# Patient Record
Sex: FEMALE | Race: BLACK OR AFRICAN AMERICAN | NOT HISPANIC OR LATINO | Employment: OTHER | URBAN - METROPOLITAN AREA
[De-identification: names, ages, dates, MRNs, and addresses within clinical notes are randomized per-mention and may not be internally consistent; named-entity substitution may affect disease eponyms.]

---

## 2022-01-18 DIAGNOSIS — U07.1 COVID-19 VIRUS DETECTED: ICD-10-CM

## 2022-04-21 ENCOUNTER — TELEPHONE (OUTPATIENT)
Dept: RHEUMATOLOGY | Facility: CLINIC | Age: 67
End: 2022-04-21

## 2022-04-21 NOTE — TELEPHONE ENCOUNTER
Spoke to Ms Al and told her next available new patient appointment is in Nov 2022. She doesn't want to schedule an appt today but wants to call back when she has her appt book. Gave her [phone] to call.

## 2022-04-26 ENCOUNTER — IMMUNIZATION (OUTPATIENT)
Dept: FAMILY MEDICINE | Facility: CLINIC | Age: 67
End: 2022-04-26

## 2022-04-26 DIAGNOSIS — Z23 NEED FOR VACCINATION: Primary | ICD-10-CM

## 2022-04-26 PROCEDURE — 91305 COVID-19, MRNA, LNP-S, PF, 30 MCG/0.3 ML DOSE VACCINE (PFIZER): CPT | Mod: PBBFAC,PO

## 2022-12-06 ENCOUNTER — OFFICE VISIT (OUTPATIENT)
Dept: RHEUMATOLOGY | Facility: CLINIC | Age: 67
End: 2022-12-06
Payer: MEDICARE

## 2022-12-06 VITALS
WEIGHT: 273.06 LBS | HEART RATE: 86 BPM | HEIGHT: 61 IN | BODY MASS INDEX: 51.55 KG/M2 | DIASTOLIC BLOOD PRESSURE: 72 MMHG | SYSTOLIC BLOOD PRESSURE: 172 MMHG

## 2022-12-06 DIAGNOSIS — M25.50 POLYARTHRALGIA: Primary | ICD-10-CM

## 2022-12-06 DIAGNOSIS — R76.8 AUTOANTIBODY TITER POSITIVE: ICD-10-CM

## 2022-12-06 DIAGNOSIS — E79.0 HYPERURICEMIA: ICD-10-CM

## 2022-12-06 PROCEDURE — 99205 OFFICE O/P NEW HI 60 MIN: CPT | Mod: S$GLB,,, | Performed by: INTERNAL MEDICINE

## 2022-12-06 PROCEDURE — 3077F SYST BP >= 140 MM HG: CPT | Mod: CPTII,S$GLB,, | Performed by: INTERNAL MEDICINE

## 2022-12-06 PROCEDURE — 3078F DIAST BP <80 MM HG: CPT | Mod: CPTII,S$GLB,, | Performed by: INTERNAL MEDICINE

## 2022-12-06 PROCEDURE — 3077F PR MOST RECENT SYSTOLIC BLOOD PRESSURE >= 140 MM HG: ICD-10-PCS | Mod: CPTII,S$GLB,, | Performed by: INTERNAL MEDICINE

## 2022-12-06 PROCEDURE — 99999 PR PBB SHADOW E&M-EST. PATIENT-LVL IV: CPT | Mod: PBBFAC,,, | Performed by: INTERNAL MEDICINE

## 2022-12-06 PROCEDURE — 3008F PR BODY MASS INDEX (BMI) DOCUMENTED: ICD-10-PCS | Mod: CPTII,S$GLB,, | Performed by: INTERNAL MEDICINE

## 2022-12-06 PROCEDURE — 3288F PR FALLS RISK ASSESSMENT DOCUMENTED: ICD-10-PCS | Mod: CPTII,S$GLB,, | Performed by: INTERNAL MEDICINE

## 2022-12-06 PROCEDURE — 1101F PR PT FALLS ASSESS DOC 0-1 FALLS W/OUT INJ PAST YR: ICD-10-PCS | Mod: CPTII,S$GLB,, | Performed by: INTERNAL MEDICINE

## 2022-12-06 PROCEDURE — 99205 PR OFFICE/OUTPT VISIT, NEW, LEVL V, 60-74 MIN: ICD-10-PCS | Mod: S$GLB,,, | Performed by: INTERNAL MEDICINE

## 2022-12-06 PROCEDURE — 3078F PR MOST RECENT DIASTOLIC BLOOD PRESSURE < 80 MM HG: ICD-10-PCS | Mod: CPTII,S$GLB,, | Performed by: INTERNAL MEDICINE

## 2022-12-06 PROCEDURE — 1159F MED LIST DOCD IN RCRD: CPT | Mod: CPTII,S$GLB,, | Performed by: INTERNAL MEDICINE

## 2022-12-06 PROCEDURE — 1101F PT FALLS ASSESS-DOCD LE1/YR: CPT | Mod: CPTII,S$GLB,, | Performed by: INTERNAL MEDICINE

## 2022-12-06 PROCEDURE — 3008F BODY MASS INDEX DOCD: CPT | Mod: CPTII,S$GLB,, | Performed by: INTERNAL MEDICINE

## 2022-12-06 PROCEDURE — 3288F FALL RISK ASSESSMENT DOCD: CPT | Mod: CPTII,S$GLB,, | Performed by: INTERNAL MEDICINE

## 2022-12-06 PROCEDURE — 1159F PR MEDICATION LIST DOCUMENTED IN MEDICAL RECORD: ICD-10-PCS | Mod: CPTII,S$GLB,, | Performed by: INTERNAL MEDICINE

## 2022-12-06 PROCEDURE — 99999 PR PBB SHADOW E&M-EST. PATIENT-LVL IV: ICD-10-PCS | Mod: PBBFAC,,, | Performed by: INTERNAL MEDICINE

## 2022-12-06 PROCEDURE — 1125F PR PAIN SEVERITY QUANTIFIED, PAIN PRESENT: ICD-10-PCS | Mod: CPTII,S$GLB,, | Performed by: INTERNAL MEDICINE

## 2022-12-06 PROCEDURE — 1125F AMNT PAIN NOTED PAIN PRSNT: CPT | Mod: CPTII,S$GLB,, | Performed by: INTERNAL MEDICINE

## 2022-12-06 RX ORDER — ALLOPURINOL 300 MG/1
300 TABLET ORAL DAILY
Status: ON HOLD | COMMUNITY
End: 2023-04-19 | Stop reason: HOSPADM

## 2022-12-06 RX ORDER — ROSUVASTATIN CALCIUM 40 MG/1
40 TABLET, COATED ORAL DAILY
COMMUNITY

## 2022-12-06 RX ORDER — LINAGLIPTIN 5 MG/1
5 TABLET, FILM COATED ORAL DAILY
COMMUNITY

## 2022-12-06 RX ORDER — SEMAGLUTIDE 1.34 MG/ML
1 INJECTION, SOLUTION SUBCUTANEOUS
COMMUNITY

## 2022-12-06 RX ORDER — ZINC GLUCONATE 50 MG
50 TABLET ORAL DAILY
COMMUNITY

## 2022-12-06 RX ORDER — HYDRALAZINE HYDROCHLORIDE 50 MG/1
50 TABLET, FILM COATED ORAL 2 TIMES DAILY
Status: ON HOLD | COMMUNITY
End: 2023-04-19 | Stop reason: HOSPADM

## 2022-12-06 RX ORDER — LEVOCETIRIZINE DIHYDROCHLORIDE 5 MG/1
5 TABLET, FILM COATED ORAL NIGHTLY
COMMUNITY

## 2022-12-06 ASSESSMENT — ROUTINE ASSESSMENT OF PATIENT INDEX DATA (RAPID3)
PAIN SCORE: 7
TOTAL RAPID3 SCORE: 4.67
FATIGUE SCORE: 1.1
PSYCHOLOGICAL DISTRESS SCORE: 1.1
MDHAQ FUNCTION SCORE: 0.6
PATIENT GLOBAL ASSESSMENT SCORE: 5

## 2022-12-06 NOTE — PROGRESS NOTES
Subjective:          Chief Complaint: Otto Al is a 67 y.o. female who had concerns including Rheumatoid Arthritis.    HPI:    By her medical history she has congestive heart failure diabetes mellitus obesity hypoventilation syndrome and I do see that she is on allopurinol.   Patient was seen with Ms. Salazar (previously with Dr. Luke office) did have some labs and was referred to Rheumatology. To patients knowledge she was told she had:  Rheumatoid arthritis.   Patient has no prior diagnosis of SLE or RA .   No seizures, strokes, DVT, nephritis, renal stones, photosensitivity, rashes (malar, psoriasis, sclerodactyly), autoimmune mediated anemia. No alopecia. No dry eyes or dry mouth that has been an issue. Hx of Lumbar arthritis treated once with intervention.   Manages her joints mostly with Tylenol or juaquin ASA (tries only few times weekly as to not harm her kidneys).   No fmHx of SLE or RA.     REVIEW OF SYSTEMS:    Review of Systems   Constitutional:  Negative for fever, malaise/fatigue and weight loss.   HENT:  Negative for sore throat.    Eyes:  Negative for double vision, photophobia and redness.   Respiratory:  Negative for cough, shortness of breath and wheezing.    Cardiovascular:  Negative for chest pain, palpitations and orthopnea.   Gastrointestinal:  Negative for abdominal pain, constipation and diarrhea.   Genitourinary:  Negative for dysuria, hematuria and urgency.   Musculoskeletal:  Positive for joint pain (knees) and myalgias. Negative for back pain.   Skin:  Negative for rash.   Neurological:  Negative for dizziness, tingling, focal weakness and headaches.   Endo/Heme/Allergies:  Does not bruise/bleed easily.   Psychiatric/Behavioral:  Negative for depression, hallucinations and suicidal ideas.              Objective:            Past Medical History:   Diagnosis Date    Biot's respiration     CHF (congestive heart failure)     Diabetes mellitus     Diastolic heart failure     Obesity  hypoventilation syndrome     on home O2 at 2L    Obstructive sleep apnea     Thyroid disease      Family History   Problem Relation Age of Onset    Breast cancer Sister     Breast cancer Maternal Aunt      Social History     Tobacco Use    Smoking status: Never    Smokeless tobacco: Never   Substance Use Topics    Alcohol use: No    Drug use: No         Current Outpatient Medications on File Prior to Visit   Medication Sig Dispense Refill    allopurinoL (ZYLOPRIM) 300 MG tablet allopurinol 300 mg tablet      aspirin (ECOTRIN) 81 MG EC tablet Take 81 mg by mouth once daily.      cholecalciferol, vitamin D3, (VITAMIN D3) 5,000 unit Tab Take 10,000 Units by mouth once daily.      cyanocobalamin 2000 MCG tablet Take 2,000 mcg by mouth once daily.      hydrALAZINE (APRESOLINE) 50 MG tablet Take 50 mg by mouth 2 (two) times a day.      levocetirizine (XYZAL) 5 MG tablet levocetirizine 5 mg tablet      levothyroxine (SYNTHROID) 150 MCG tablet Take 150 mcg by mouth Daily. (Patient taking differently: Take 150 mcg by mouth before breakfast.)      linaGLIPtin (TRADJENTA) 5 mg Tab tablet Tradjenta 5 mg tablet      liothyronine (CYTOMEL) 25 MCG Tab Take 25 mcg by mouth Daily.      losartan (COZAAR) 25 MG tablet Take 25 mg by mouth once daily.      metformin (GLUCOPHAGE) 500 MG tablet Take 500 mg by mouth 2 (two) times daily with meals.      multivitamin capsule Take 1 capsule by mouth once daily.      nebivolol (BYSTOLIC) 2.5 MG Tab Take 5 mg by mouth once daily.       OXYGEN-AIR DELIVERY SYSTEMS MISC 3 L/min by Nasal route continuous. 2-4 lpm      rosuvastatin (CRESTOR) 40 MG Tab rosuvastatin 40 mg tablet      semaglutide (OZEMPIC) 1 mg/dose (4 mg/3 mL) Ozempic 1 mg/dose (4 mg/3 mL) subcutaneous pen injector      zinc gluconate 50 mg tablet Take 50 mg by mouth once daily.      albuterol sulfate 2.5 mg/0.5 mL Nebu Take 2.5 mg by nebulization 2 (two) times daily. 150 mg 1    atorvastatin (LIPITOR) 40 MG tablet Take 40 mg by  mouth once daily.      furosemide (LASIX) 40 MG tablet Take 1 tablet (40 mg total) by mouth 2 (two) times daily. 60 tablet 11     No current facility-administered medications on file prior to visit.       Vitals:    12/06/22 0915   BP: (!) 172/72   Pulse: 86       Physical Exam:    Physical Exam  Constitutional:       Appearance: She is well-developed.   HENT:      Head: Normocephalic and atraumatic.   Eyes:      Pupils: Pupils are equal, round, and reactive to light.   Cardiovascular:      Rate and Rhythm: Normal rate and regular rhythm.      Heart sounds: Normal heart sounds.   Pulmonary:      Effort: Pulmonary effort is normal.      Breath sounds: Normal breath sounds.   Musculoskeletal:      Right shoulder: No swelling or tenderness. Normal range of motion.      Left shoulder: No swelling or tenderness. Normal range of motion.      Right elbow: No swelling. Normal range of motion. No tenderness.      Left elbow: No swelling. Normal range of motion. No tenderness.      Right wrist: No swelling or tenderness. Normal range of motion.      Left wrist: No swelling or tenderness. Normal range of motion.      Right hand: No swelling or tenderness. Normal range of motion.      Left hand: No swelling or tenderness. Normal range of motion.      Cervical back: Normal range of motion.      Right knee: No swelling. Normal range of motion. No tenderness.      Left knee: No swelling. Normal range of motion. No tenderness.      Right foot: Normal range of motion. No swelling or tenderness.      Left foot: Normal range of motion. No swelling or tenderness.      Comments: +crepitus bilateral knees    Skin:     General: Skin is warm and dry.   Neurological:      Mental Status: She is alert and oriented to person, place, and time.   Psychiatric:         Behavior: Behavior normal.             Assessment:       Encounter Diagnoses   Name Primary?    Polyarthralgia Yes    Autoantibody titer positive     Hyperuricemia           Plan:         Polyarthralgia  -     RHEUMATOID FACTOR; Future; Expected date: 12/06/2022  -     Cyclic Citrullinated Peptide Antibody, IgG; Future; Expected date: 12/06/2022  -     Sedimentation rate; Future; Expected date: 12/06/2022  -     C-Reactive Protein; Standing; Expected date: 12/06/2022    Autoantibody titer positive    Hyperuricemia  -     Uric Acid; Future; Expected date: 12/06/2022    Absolutely delightful 67 year old female with ? Hx of RA but we did not have the labs from her PCP.   We will check her serologies.   Certainly she has crepitus knees with valgus changes. Older imaging from [facility] reviewed with tricompartmental narrowing.  Discussed options with her about imaging and injections (prefers to avoid injections)   For now continue with ASA and Tylenol PRN- she is cautious about harming her kidney. Discussed Tylenol would not adversely affect her kidney and she can safely use 2,000mg daily     No follow-ups on file.      F/u 6-8 weeks to discuss labs and options.   60min consultation with greater than 50% of that time included Preparing to see the patient (review records, tests), Obtaining and/or reviewing separately obtained historical data, Performing a medically appropriate examination and/or evaluation , Ordering medications, tests, and/or procedures, Referring and communicating with other healthcare professionals , Documenting clinical information in the electronic or other health record and Independently interpreting results  (as warranted) & communicating results to the patient/family/caregiver. All questions answered.

## 2023-02-07 ENCOUNTER — OFFICE VISIT (OUTPATIENT)
Dept: RHEUMATOLOGY | Facility: CLINIC | Age: 68
End: 2023-02-07
Payer: MEDICARE

## 2023-02-07 VITALS
SYSTOLIC BLOOD PRESSURE: 207 MMHG | HEART RATE: 71 BPM | WEIGHT: 278.75 LBS | HEIGHT: 61 IN | BODY MASS INDEX: 52.63 KG/M2 | DIASTOLIC BLOOD PRESSURE: 96 MMHG

## 2023-02-07 DIAGNOSIS — M06.09 RHEUMATOID ARTHRITIS OF MULTIPLE SITES WITH NEGATIVE RHEUMATOID FACTOR: ICD-10-CM

## 2023-02-07 DIAGNOSIS — M25.50 POLYARTHRALGIA: Primary | ICD-10-CM

## 2023-02-07 PROCEDURE — 1159F PR MEDICATION LIST DOCUMENTED IN MEDICAL RECORD: ICD-10-PCS | Mod: CPTII,S$GLB,, | Performed by: INTERNAL MEDICINE

## 2023-02-07 PROCEDURE — 3288F FALL RISK ASSESSMENT DOCD: CPT | Mod: CPTII,S$GLB,, | Performed by: INTERNAL MEDICINE

## 2023-02-07 PROCEDURE — 4010F ACE/ARB THERAPY RXD/TAKEN: CPT | Mod: CPTII,S$GLB,, | Performed by: INTERNAL MEDICINE

## 2023-02-07 PROCEDURE — 3008F PR BODY MASS INDEX (BMI) DOCUMENTED: ICD-10-PCS | Mod: CPTII,S$GLB,, | Performed by: INTERNAL MEDICINE

## 2023-02-07 PROCEDURE — 3080F PR MOST RECENT DIASTOLIC BLOOD PRESSURE >= 90 MM HG: ICD-10-PCS | Mod: CPTII,S$GLB,, | Performed by: INTERNAL MEDICINE

## 2023-02-07 PROCEDURE — 1125F PR PAIN SEVERITY QUANTIFIED, PAIN PRESENT: ICD-10-PCS | Mod: CPTII,S$GLB,, | Performed by: INTERNAL MEDICINE

## 2023-02-07 PROCEDURE — 1159F MED LIST DOCD IN RCRD: CPT | Mod: CPTII,S$GLB,, | Performed by: INTERNAL MEDICINE

## 2023-02-07 PROCEDURE — 99214 OFFICE O/P EST MOD 30 MIN: CPT | Mod: S$GLB,,, | Performed by: INTERNAL MEDICINE

## 2023-02-07 PROCEDURE — 4010F PR ACE/ARB THEARPY RXD/TAKEN: ICD-10-PCS | Mod: CPTII,S$GLB,, | Performed by: INTERNAL MEDICINE

## 2023-02-07 PROCEDURE — 3077F SYST BP >= 140 MM HG: CPT | Mod: CPTII,S$GLB,, | Performed by: INTERNAL MEDICINE

## 2023-02-07 PROCEDURE — 3080F DIAST BP >= 90 MM HG: CPT | Mod: CPTII,S$GLB,, | Performed by: INTERNAL MEDICINE

## 2023-02-07 PROCEDURE — 1160F RVW MEDS BY RX/DR IN RCRD: CPT | Mod: CPTII,S$GLB,, | Performed by: INTERNAL MEDICINE

## 2023-02-07 PROCEDURE — 99999 PR PBB SHADOW E&M-EST. PATIENT-LVL IV: ICD-10-PCS | Mod: PBBFAC,,, | Performed by: INTERNAL MEDICINE

## 2023-02-07 PROCEDURE — 1160F PR REVIEW ALL MEDS BY PRESCRIBER/CLIN PHARMACIST DOCUMENTED: ICD-10-PCS | Mod: CPTII,S$GLB,, | Performed by: INTERNAL MEDICINE

## 2023-02-07 PROCEDURE — 3008F BODY MASS INDEX DOCD: CPT | Mod: CPTII,S$GLB,, | Performed by: INTERNAL MEDICINE

## 2023-02-07 PROCEDURE — 3077F PR MOST RECENT SYSTOLIC BLOOD PRESSURE >= 140 MM HG: ICD-10-PCS | Mod: CPTII,S$GLB,, | Performed by: INTERNAL MEDICINE

## 2023-02-07 PROCEDURE — 1125F AMNT PAIN NOTED PAIN PRSNT: CPT | Mod: CPTII,S$GLB,, | Performed by: INTERNAL MEDICINE

## 2023-02-07 PROCEDURE — 1101F PR PT FALLS ASSESS DOC 0-1 FALLS W/OUT INJ PAST YR: ICD-10-PCS | Mod: CPTII,S$GLB,, | Performed by: INTERNAL MEDICINE

## 2023-02-07 PROCEDURE — 1101F PT FALLS ASSESS-DOCD LE1/YR: CPT | Mod: CPTII,S$GLB,, | Performed by: INTERNAL MEDICINE

## 2023-02-07 PROCEDURE — 99999 PR PBB SHADOW E&M-EST. PATIENT-LVL IV: CPT | Mod: PBBFAC,,, | Performed by: INTERNAL MEDICINE

## 2023-02-07 PROCEDURE — 99214 PR OFFICE/OUTPT VISIT, EST, LEVL IV, 30-39 MIN: ICD-10-PCS | Mod: S$GLB,,, | Performed by: INTERNAL MEDICINE

## 2023-02-07 PROCEDURE — 3288F PR FALLS RISK ASSESSMENT DOCUMENTED: ICD-10-PCS | Mod: CPTII,S$GLB,, | Performed by: INTERNAL MEDICINE

## 2023-02-07 RX ORDER — HYDROXYCHLOROQUINE SULFATE 200 MG/1
200 TABLET, FILM COATED ORAL 2 TIMES DAILY
Qty: 60 TABLET | Refills: 6 | Status: SHIPPED | OUTPATIENT
Start: 2023-02-07 | End: 2024-02-07

## 2023-02-07 ASSESSMENT — ROUTINE ASSESSMENT OF PATIENT INDEX DATA (RAPID3)
FATIGUE SCORE: 1.1
MDHAQ FUNCTION SCORE: 0.9
PATIENT GLOBAL ASSESSMENT SCORE: 4.5
TOTAL RAPID3 SCORE: 5
PAIN SCORE: 7.5
PSYCHOLOGICAL DISTRESS SCORE: 0

## 2023-02-07 NOTE — PATIENT INSTRUCTIONS
Dr. Slade--Labs can be done when your PCP orders labs anytime, or have them done right before our next visit.

## 2023-02-07 NOTE — PROGRESS NOTES
Subjective:          Chief Complaint: Otto Al is a 67 y.o. female who had concerns including Disease Management.    HPI:  Interval   2/2023: patient with high titer +RF no real sicca complaints. Most consistent with RA   Overall legs (knees, ankles, feet) are the worst. Knees with advanced OA.     Rheum Hx:   By her medical history she has congestive heart failure diabetes mellitus, obesity hypoventilation syndrome, and I do see that she is on allopurinol.   Patient was seen with Ms. Salazar (previously with Dr. Luke office) did have some labs and was referred to Rheumatology. To patients knowledge she was told she had:  Rheumatoid arthritis.   Patient has no prior diagnosis of SLE or RA .   No seizures, strokes, DVT, nephritis, renal stones, photosensitivity, rashes (malar, psoriasis, sclerodactyly), autoimmune mediated anemia. No alopecia. No dry eyes or dry mouth that has been an issue. Hx of Lumbar arthritis treated once with intervention.   Manages her joints mostly with Tylenol or juaquin ASA (tries only few times weekly as to not harm her kidneys).   No fmHx of SLE or RA.     Patient returns to review labs.:   Denies burning itching eyes.   Component      Latest Ref Rng & Units 1/9/2023   Rheumatoid Factor      0.0 - 15.0 IU/mL 164.6 (H)   CCP Antibodies      <5.0 U/mL <0.5   Sed Rate      0 - 29 mm/Hr 67 (H)   CRP      0.00 - 0.90 mg/dL 0.70   Uric Acid      2.4 - 5.7 mg/dL 9.0 (H)         REVIEW OF SYSTEMS:    Review of Systems   Constitutional:  Negative for fever, malaise/fatigue and weight loss.   HENT:  Negative for sore throat.    Eyes:  Negative for double vision, photophobia and redness.   Respiratory:  Negative for cough, shortness of breath and wheezing.    Cardiovascular:  Negative for chest pain, palpitations and orthopnea.   Gastrointestinal:  Negative for abdominal pain, constipation and diarrhea.   Genitourinary:  Negative for dysuria, hematuria and urgency.   Musculoskeletal:  Positive  for joint pain (knees) and myalgias. Negative for back pain.   Skin:  Negative for rash.   Neurological:  Negative for dizziness, tingling, focal weakness and headaches.   Endo/Heme/Allergies:  Does not bruise/bleed easily.   Psychiatric/Behavioral:  Negative for depression, hallucinations and suicidal ideas.              Objective:            Past Medical History:   Diagnosis Date    Biot's respiration     CHF (congestive heart failure)     Diabetes mellitus     Diastolic heart failure     Obesity hypoventilation syndrome     on home O2 at 2L    Obstructive sleep apnea     Thyroid disease      Family History   Problem Relation Age of Onset    Breast cancer Sister     Breast cancer Maternal Aunt      Social History     Tobacco Use    Smoking status: Never    Smokeless tobacco: Never   Substance Use Topics    Alcohol use: No    Drug use: No         Current Outpatient Medications on File Prior to Visit   Medication Sig Dispense Refill    allopurinoL (ZYLOPRIM) 300 MG tablet allopurinol 300 mg tablet      aspirin (ECOTRIN) 81 MG EC tablet Take 81 mg by mouth once daily.      atorvastatin (LIPITOR) 40 MG tablet Take 40 mg by mouth once daily.      cholecalciferol, vitamin D3, (VITAMIN D3) 5,000 unit Tab Take 10,000 Units by mouth once daily.      cyanocobalamin 2000 MCG tablet Take 2,000 mcg by mouth once daily.      hydrALAZINE (APRESOLINE) 50 MG tablet Take 50 mg by mouth 2 (two) times a day.      levocetirizine (XYZAL) 5 MG tablet levocetirizine 5 mg tablet      levothyroxine (SYNTHROID) 150 MCG tablet Take 150 mcg by mouth Daily. (Patient taking differently: Take 150 mcg by mouth before breakfast.)      linaGLIPtin (TRADJENTA) 5 mg Tab tablet Tradjenta 5 mg tablet      liothyronine (CYTOMEL) 25 MCG Tab Take 25 mcg by mouth Daily.      losartan (COZAAR) 25 MG tablet Take 25 mg by mouth once daily.      metformin (GLUCOPHAGE) 500 MG tablet Take 500 mg by mouth 2 (two) times daily with meals.      multivitamin capsule  Take 1 capsule by mouth once daily.      nebivolol (BYSTOLIC) 2.5 MG Tab Take 5 mg by mouth once daily.       OXYGEN-AIR DELIVERY SYSTEMS MISC 3 L/min by Nasal route continuous. 2-4 lpm      rosuvastatin (CRESTOR) 40 MG Tab rosuvastatin 40 mg tablet      semaglutide (OZEMPIC) 1 mg/dose (4 mg/3 mL) Ozempic 1 mg/dose (4 mg/3 mL) subcutaneous pen injector      zinc gluconate 50 mg tablet Take 50 mg by mouth once daily.      albuterol sulfate 2.5 mg/0.5 mL Nebu Take 2.5 mg by nebulization 2 (two) times daily. 150 mg 1    furosemide (LASIX) 40 MG tablet Take 1 tablet (40 mg total) by mouth 2 (two) times daily. 60 tablet 11     No current facility-administered medications on file prior to visit.       Vitals:    02/07/23 1324   BP: (!) 207/96   Pulse: 71       Physical Exam:    Physical Exam  Constitutional:       Appearance: She is well-developed.   HENT:      Head: Normocephalic and atraumatic.   Eyes:      Pupils: Pupils are equal, round, and reactive to light.   Cardiovascular:      Rate and Rhythm: Normal rate and regular rhythm.      Heart sounds: Normal heart sounds.   Pulmonary:      Effort: Pulmonary effort is normal.      Breath sounds: Normal breath sounds.   Musculoskeletal:      Right shoulder: No swelling or tenderness. Normal range of motion.      Left shoulder: No swelling or tenderness. Normal range of motion.      Right elbow: No swelling. Normal range of motion. No tenderness.      Left elbow: No swelling. Normal range of motion. No tenderness.      Right wrist: No swelling or tenderness. Normal range of motion.      Left wrist: No swelling or tenderness. Normal range of motion.      Right hand: No swelling or tenderness. Normal range of motion.      Left hand: No swelling or tenderness. Normal range of motion.      Cervical back: Normal range of motion.      Right knee: No swelling. Normal range of motion. No tenderness.      Left knee: No swelling. Normal range of motion. No tenderness.      Right  foot: Normal range of motion. No swelling or tenderness.      Left foot: Normal range of motion. No swelling or tenderness.      Comments: +crepitus bilateral knees    Skin:     General: Skin is warm and dry.   Neurological:      Mental Status: She is alert and oriented to person, place, and time.   Psychiatric:         Behavior: Behavior normal.             Assessment:       Encounter Diagnoses   Name Primary?    Polyarthralgia Yes    Rheumatoid arthritis of multiple sites with negative rheumatoid factor             Plan:        Polyarthralgia    Rheumatoid arthritis of multiple sites with negative rheumatoid factor  -     CBC Auto Differential; Standing; Expected date: 02/07/2023  -     Comprehensive Metabolic Panel; Standing; Expected date: 02/07/2023  -     Sedimentation rate; Standing; Expected date: 02/07/2023  -     C-Reactive Protein; Standing; Expected date: 02/07/2023  -     hydrOXYchloroQUINE (PLAQUENIL) 200 mg tablet; Take 1 tablet (200 mg total) by mouth 2 (two) times daily.  Dispense: 60 tablet; Refill: 6      Absolutely delightful 67 year old female with sero+ RA   Ok to continue Tylenol for as needed pain  Discussed starting HCQ for now   She will likely continue to have pain in knees as she has advanced OA here not sure how much HCQ will help on this level.       Follow up in about 4 months (around 6/7/2023).        30min consultation with greater than 50% of that time included Preparing to see the patient (review records, tests), Obtaining and/or reviewing separately obtained historical data, Performing a medically appropriate examination and/or evaluation , Ordering medications, tests, and/or procedures, Referring and communicating with other healthcare professionals , Documenting clinical information in the electronic or other health record and Independently interpreting results  (as warranted) & communicating results to the patient/family/caregiver. All questions answered.   Discussed the risks  benefits and side effects of hydroxychloroquine including but not limited to retinal toxicity, rashes, nausea.  Patient is aware of the monitoring requirements of an annual eye exam will have this done following a trial to see if  tolerates the medication.

## 2023-04-07 PROBLEM — I16.1 HYPERTENSIVE EMERGENCY: Status: ACTIVE | Noted: 2023-04-07

## 2023-04-07 PROBLEM — N18.30 CKD (CHRONIC KIDNEY DISEASE), STAGE III: Status: ACTIVE | Noted: 2023-04-07

## 2023-04-08 PROBLEM — M05.9 RHEUMATOID ARTHRITIS WITH POSITIVE RHEUMATOID FACTOR: Status: ACTIVE | Noted: 2023-04-08

## 2023-04-08 PROBLEM — N17.9 AKI (ACUTE KIDNEY INJURY): Status: ACTIVE | Noted: 2023-04-08

## 2023-04-10 PROBLEM — N93.9 VAGINAL BLEEDING: Status: ACTIVE | Noted: 2023-04-10

## 2023-04-10 PROBLEM — N95.0 POSTMENOPAUSAL BLEEDING: Status: ACTIVE | Noted: 2023-04-10

## 2023-04-11 PROBLEM — J96.02 ACUTE HYPERCAPNIC RESPIRATORY FAILURE: Status: ACTIVE | Noted: 2023-04-11

## 2023-04-14 PROBLEM — E55.9 VITAMIN D DEFICIENCY: Status: ACTIVE | Noted: 2023-04-14

## 2023-04-18 PROBLEM — E79.0 HYPERURICEMIA: Status: ACTIVE | Noted: 2023-04-18

## 2023-07-24 PROBLEM — N17.9 AKI (ACUTE KIDNEY INJURY): Status: RESOLVED | Noted: 2023-04-08 | Resolved: 2023-07-24

## 2023-07-24 PROBLEM — J96.02 ACUTE HYPERCAPNIC RESPIRATORY FAILURE: Status: RESOLVED | Noted: 2023-04-11 | Resolved: 2023-07-24

## 2023-10-03 ENCOUNTER — OFFICE VISIT (OUTPATIENT)
Dept: RHEUMATOLOGY | Facility: CLINIC | Age: 68
End: 2023-10-03
Payer: MEDICARE

## 2023-10-03 VITALS
BODY MASS INDEX: 50.36 KG/M2 | HEIGHT: 61 IN | HEART RATE: 71 BPM | SYSTOLIC BLOOD PRESSURE: 193 MMHG | DIASTOLIC BLOOD PRESSURE: 75 MMHG | WEIGHT: 266.75 LBS

## 2023-10-03 DIAGNOSIS — M05.79 RHEUMATOID ARTHRITIS INVOLVING MULTIPLE SITES WITH POSITIVE RHEUMATOID FACTOR: Primary | ICD-10-CM

## 2023-10-03 DIAGNOSIS — G56.22 CUBITAL TUNNEL SYNDROME ON LEFT: ICD-10-CM

## 2023-10-03 DIAGNOSIS — M15.9 PRIMARY OSTEOARTHRITIS INVOLVING MULTIPLE JOINTS: ICD-10-CM

## 2023-10-03 PROCEDURE — 3077F SYST BP >= 140 MM HG: CPT | Mod: CPTII,S$GLB,, | Performed by: INTERNAL MEDICINE

## 2023-10-03 PROCEDURE — 3288F FALL RISK ASSESSMENT DOCD: CPT | Mod: CPTII,S$GLB,, | Performed by: INTERNAL MEDICINE

## 2023-10-03 PROCEDURE — 99999 PR PBB SHADOW E&M-EST. PATIENT-LVL IV: ICD-10-PCS | Mod: PBBFAC,,, | Performed by: INTERNAL MEDICINE

## 2023-10-03 PROCEDURE — 99215 PR OFFICE/OUTPT VISIT, EST, LEVL V, 40-54 MIN: ICD-10-PCS | Mod: S$GLB,,, | Performed by: INTERNAL MEDICINE

## 2023-10-03 PROCEDURE — 3066F PR DOCUMENTATION OF TREATMENT FOR NEPHROPATHY: ICD-10-PCS | Mod: CPTII,S$GLB,, | Performed by: INTERNAL MEDICINE

## 2023-10-03 PROCEDURE — 1101F PT FALLS ASSESS-DOCD LE1/YR: CPT | Mod: CPTII,S$GLB,, | Performed by: INTERNAL MEDICINE

## 2023-10-03 PROCEDURE — 99215 OFFICE O/P EST HI 40 MIN: CPT | Mod: S$GLB,,, | Performed by: INTERNAL MEDICINE

## 2023-10-03 PROCEDURE — 3288F PR FALLS RISK ASSESSMENT DOCUMENTED: ICD-10-PCS | Mod: CPTII,S$GLB,, | Performed by: INTERNAL MEDICINE

## 2023-10-03 PROCEDURE — 3008F PR BODY MASS INDEX (BMI) DOCUMENTED: ICD-10-PCS | Mod: CPTII,S$GLB,, | Performed by: INTERNAL MEDICINE

## 2023-10-03 PROCEDURE — 3066F NEPHROPATHY DOC TX: CPT | Mod: CPTII,S$GLB,, | Performed by: INTERNAL MEDICINE

## 2023-10-03 PROCEDURE — 3044F HG A1C LEVEL LT 7.0%: CPT | Mod: CPTII,S$GLB,, | Performed by: INTERNAL MEDICINE

## 2023-10-03 PROCEDURE — 1159F MED LIST DOCD IN RCRD: CPT | Mod: CPTII,S$GLB,, | Performed by: INTERNAL MEDICINE

## 2023-10-03 PROCEDURE — 1101F PR PT FALLS ASSESS DOC 0-1 FALLS W/OUT INJ PAST YR: ICD-10-PCS | Mod: CPTII,S$GLB,, | Performed by: INTERNAL MEDICINE

## 2023-10-03 PROCEDURE — 3008F BODY MASS INDEX DOCD: CPT | Mod: CPTII,S$GLB,, | Performed by: INTERNAL MEDICINE

## 2023-10-03 PROCEDURE — 4010F PR ACE/ARB THEARPY RXD/TAKEN: ICD-10-PCS | Mod: CPTII,S$GLB,, | Performed by: INTERNAL MEDICINE

## 2023-10-03 PROCEDURE — 1125F PR PAIN SEVERITY QUANTIFIED, PAIN PRESENT: ICD-10-PCS | Mod: CPTII,S$GLB,, | Performed by: INTERNAL MEDICINE

## 2023-10-03 PROCEDURE — 3044F PR MOST RECENT HEMOGLOBIN A1C LEVEL <7.0%: ICD-10-PCS | Mod: CPTII,S$GLB,, | Performed by: INTERNAL MEDICINE

## 2023-10-03 PROCEDURE — 4010F ACE/ARB THERAPY RXD/TAKEN: CPT | Mod: CPTII,S$GLB,, | Performed by: INTERNAL MEDICINE

## 2023-10-03 PROCEDURE — 3077F PR MOST RECENT SYSTOLIC BLOOD PRESSURE >= 140 MM HG: ICD-10-PCS | Mod: CPTII,S$GLB,, | Performed by: INTERNAL MEDICINE

## 2023-10-03 PROCEDURE — 1159F PR MEDICATION LIST DOCUMENTED IN MEDICAL RECORD: ICD-10-PCS | Mod: CPTII,S$GLB,, | Performed by: INTERNAL MEDICINE

## 2023-10-03 PROCEDURE — 3078F PR MOST RECENT DIASTOLIC BLOOD PRESSURE < 80 MM HG: ICD-10-PCS | Mod: CPTII,S$GLB,, | Performed by: INTERNAL MEDICINE

## 2023-10-03 PROCEDURE — 3061F PR NEG MICROALBUMINURIA RESULT DOCUMENTED/REVIEW: ICD-10-PCS | Mod: CPTII,S$GLB,, | Performed by: INTERNAL MEDICINE

## 2023-10-03 PROCEDURE — 1125F AMNT PAIN NOTED PAIN PRSNT: CPT | Mod: CPTII,S$GLB,, | Performed by: INTERNAL MEDICINE

## 2023-10-03 PROCEDURE — 99999 PR PBB SHADOW E&M-EST. PATIENT-LVL IV: CPT | Mod: PBBFAC,,, | Performed by: INTERNAL MEDICINE

## 2023-10-03 PROCEDURE — 1160F PR REVIEW ALL MEDS BY PRESCRIBER/CLIN PHARMACIST DOCUMENTED: ICD-10-PCS | Mod: CPTII,S$GLB,, | Performed by: INTERNAL MEDICINE

## 2023-10-03 PROCEDURE — 3078F DIAST BP <80 MM HG: CPT | Mod: CPTII,S$GLB,, | Performed by: INTERNAL MEDICINE

## 2023-10-03 PROCEDURE — 3061F NEG MICROALBUMINURIA REV: CPT | Mod: CPTII,S$GLB,, | Performed by: INTERNAL MEDICINE

## 2023-10-03 PROCEDURE — 1160F RVW MEDS BY RX/DR IN RCRD: CPT | Mod: CPTII,S$GLB,, | Performed by: INTERNAL MEDICINE

## 2023-10-03 RX ORDER — ALLOPURINOL 100 MG/1
100 TABLET ORAL
COMMUNITY
Start: 2023-08-24

## 2023-10-03 RX ORDER — SULFASALAZINE 500 MG/1
TABLET ORAL
Qty: 90 TABLET | Refills: 3 | Status: SHIPPED | OUTPATIENT
Start: 2023-10-03

## 2023-10-03 RX ORDER — EMPAGLIFLOZIN 10 MG/1
10 TABLET, FILM COATED ORAL
COMMUNITY
Start: 2023-09-11

## 2023-10-03 ASSESSMENT — ROUTINE ASSESSMENT OF PATIENT INDEX DATA (RAPID3)
PATIENT GLOBAL ASSESSMENT SCORE: 6
FATIGUE SCORE: 1.1
MDHAQ FUNCTION SCORE: 0.6
PAIN SCORE: 9
PSYCHOLOGICAL DISTRESS SCORE: 1.1
TOTAL RAPID3 SCORE: 5.67

## 2023-10-03 NOTE — PATIENT INSTRUCTIONS
Keep taking Hydroxychloroquine  2.  Ok for Tylenol up to 2,500mg a day.     3.  Sulfasalazine start    Take 1 tablet by mouth daily for 4 days  Then take 1 tablet in the morning and 1 tablet at night for 4 days  Then take 2 tablets in the morning and 1 tablet at night for 4 days  Then take 2 tablets in the morning and 2 tablets at night and  continue this as tolerated.     Labs again at [facility] prior to next visit.   Dr. Renate Slade  Rheumatology

## 2023-10-03 NOTE — PROGRESS NOTES
"Subjective:          Chief Complaint: Otto Al is a 68 y.o. female who had concerns including Disease Management.    HPI:  Interval   10/2023: started HCQ 8 months ago  Today, 10/2023, patient states she is not appreciating much benefit form HCQ, but tolerating.   Pain is 7/10     She is having a more diffuse pattern of arthritis shoulder but states "down the arm" some numbness across all 5 finger tips. She has motion about the shoulder, some tenderness between shoulder and elbow. Numbness does have some cubital distribution but is not positional. MCP, PIP and DIP joints not the painful.     Knees painful with standing, and full extension (pop fossa). She notes some days she has instability. Ambulated with cane.       2/2023: patient with high titer +RF with +SSA and no real sicca complaints. Most consistent with RA   Overall legs (knees, ankles, feet) are the worst. Knees with advanced OA.     Rheum Hx:   By her medical history she has congestive heart failure diabetes mellitus, obesity hypoventilation syndrome, and I do see that she is on allopurinol.   Patient was seen with Ms. Salazar (previously with Dr. Luke office) did have some labs and was referred to Rheumatology. To patients knowledge she was told she had:  Rheumatoid arthritis.   Patient has no prior diagnosis of SLE or RA .   No seizures, strokes, DVT, nephritis, renal stones, photosensitivity, rashes (malar, psoriasis, sclerodactyly), autoimmune mediated anemia. No alopecia. No dry eyes or dry mouth that has been an issue. Hx of Lumbar arthritis treated once with intervention.   Manages her joints mostly with Tylenol or juaquin ASA (tries only few times weekly as to not harm her kidneys).   No fmHx of SLE or RA.       Component      Latest Ref Rng & Units 1/9/2023   Rheumatoid Factor      0.0 - 15.0 IU/mL 164.6 (H)   CCP Antibodies      <5.0 U/mL <0.5   Sed Rate      0 - 29 mm/Hr 67 (H)   CRP      0.00 - 0.90 mg/dL 0.70   Uric Acid      2.4 - 5.7 " mg/dL 9.0 (H)         REVIEW OF SYSTEMS:    Review of Systems   Constitutional:  Negative for fever, malaise/fatigue and weight loss.   HENT:  Negative for sore throat.    Eyes:  Negative for double vision, photophobia and redness.   Respiratory:  Negative for cough, shortness of breath and wheezing.    Cardiovascular:  Negative for chest pain, palpitations and orthopnea.   Gastrointestinal:  Negative for abdominal pain, constipation and diarrhea.   Genitourinary:  Negative for dysuria, hematuria and urgency.   Musculoskeletal:  Positive for joint pain (knees) and myalgias. Negative for back pain.   Skin:  Negative for rash.   Neurological:  Negative for dizziness, tingling, focal weakness and headaches.   Endo/Heme/Allergies:  Does not bruise/bleed easily.   Psychiatric/Behavioral:  Negative for depression, hallucinations and suicidal ideas.                Objective:            Past Medical History:   Diagnosis Date    Biot's respiration     CHF (congestive heart failure)     Diabetes mellitus     Diastolic heart failure     Obesity hypoventilation syndrome     on home O2 at 2L    Obstructive sleep apnea     Thyroid disease      Family History   Problem Relation Age of Onset    Breast cancer Sister     Breast cancer Maternal Aunt      Social History     Tobacco Use    Smoking status: Never    Smokeless tobacco: Never   Substance Use Topics    Alcohol use: No    Drug use: No         Current Outpatient Medications on File Prior to Visit   Medication Sig Dispense Refill    allopurinoL (ZYLOPRIM) 100 MG tablet Take 100 mg by mouth.      aspirin (ECOTRIN) 81 MG EC tablet Take 81 mg by mouth once daily.      cholecalciferol, vitamin D3, (VITAMIN D3) 5,000 unit Tab Take 10,000 Units by mouth once daily. Indications: low vitamin D levels      cyanocobalamin, vitamin B-12, 2,500 mcg Tab Take 5,000 mcg by mouth once daily.      hydrALAZINE (APRESOLINE) 100 MG tablet Take 1 tablet (100 mg total) by mouth every 8 (eight)  hours. 90 tablet 11    hydrOXYchloroQUINE (PLAQUENIL) 200 mg tablet Take 1 tablet (200 mg total) by mouth 2 (two) times daily. 60 tablet 6    JARDIANCE 10 mg tablet Take 10 mg by mouth.      levocetirizine (XYZAL) 5 MG tablet Take 5 mg by mouth every evening.      levothyroxine (SYNTHROID, LEVOTHROID) 175 MCG tablet Take 175 mcg by mouth before breakfast.      linaGLIPtin (TRADJENTA) 5 mg Tab tablet Take 5 mg by mouth once daily.      liothyronine (CYTOMEL) 25 MCG Tab Take 25 mcg by mouth Daily. Indications: a condition with low thyroid hormone levels      multivitamin capsule Take 1 capsule by mouth once daily.      nebivoloL (BYSTOLIC) 10 MG Tab Take 10 mg by mouth once daily.      OXYGEN-AIR DELIVERY SYSTEMS MISC 3 L/min by Nasal route continuous. 2-4 lpm  Indications: air hunger prevention       rosuvastatin (CRESTOR) 40 MG Tab Take 40 mg by mouth once daily.      semaglutide (OZEMPIC) 1 mg/dose (4 mg/3 mL) Inject 1 mg into the skin every Sunday.      zinc gluconate 50 mg tablet Take 50 mg by mouth once daily.      albuterol sulfate 2.5 mg/0.5 mL Nebu Take 2.5 mg by nebulization every 4 (four) hours as needed (wheezing/shortness of breathe). Rescue      albuterol-ipratropium (DUO-NEB) 2.5 mg-0.5 mg/3 mL nebulizer solution Take 3 mLs by nebulization every 6 (six) hours as needed for Wheezing. Rescue (Patient not taking: Reported on 10/3/2023) 75 mL 0    amLODIPine (NORVASC) 10 MG tablet Take 1 tablet (10 mg total) by mouth once daily. (Patient not taking: Reported on 4/20/2023) 30 tablet 11    isosorbide mononitrate (IMDUR) 30 MG 24 hr tablet Take 1 tablet (30 mg total) by mouth every evening. (Patient not taking: Reported on 4/20/2023) 30 tablet 2    [DISCONTINUED] buPROPion (WELLBUTRIN SR) 150 MG TBSR 12 hr tablet Take 150 mg by mouth once daily.       No current facility-administered medications on file prior to visit.       Vitals:    10/03/23 0821   BP: (!) 193/75   Pulse: 71       Physical Exam:    Physical  Exam  Constitutional:       Appearance: She is well-developed.   HENT:      Head: Normocephalic and atraumatic.   Eyes:      Pupils: Pupils are equal, round, and reactive to light.   Cardiovascular:      Rate and Rhythm: Normal rate and regular rhythm.      Heart sounds: Normal heart sounds.   Pulmonary:      Effort: Pulmonary effort is normal.      Breath sounds: Normal breath sounds.   Musculoskeletal:      Right shoulder: No swelling or tenderness. Normal range of motion.      Left shoulder: No swelling or tenderness. Normal range of motion.      Right elbow: No swelling. Normal range of motion. No tenderness.      Left elbow: No swelling. Normal range of motion. No tenderness.      Right wrist: No swelling or tenderness. Normal range of motion.      Left wrist: No swelling or tenderness. Normal range of motion.      Right hand: No swelling or tenderness. Normal range of motion.      Left hand: No swelling or tenderness. Normal range of motion.      Cervical back: Normal range of motion.      Right knee: No swelling. Normal range of motion. No tenderness.      Left knee: No swelling. Normal range of motion. No tenderness.      Right foot: Normal range of motion. No swelling or tenderness.      Left foot: Normal range of motion. No swelling or tenderness.      Comments: +crepitus bilateral knees    Skin:     General: Skin is warm and dry.   Neurological:      Mental Status: She is alert and oriented to person, place, and time.   Psychiatric:         Behavior: Behavior normal.               Assessment:       Encounter Diagnoses   Name Primary?    Rheumatoid arthritis involving multiple sites with positive rheumatoid factor Yes    Primary osteoarthritis involving multiple joints     Cubital tunnel syndrome on left               Plan:        Rheumatoid arthritis involving multiple sites with positive rheumatoid factor  -     sulfaSALAzine (AZULFIDINE) 500 mg Tab; 1,000mg in AM and 500mg at night. Follow handout for  starting.  Dispense: 90 tablet; Refill: 3  -     CBC Auto Differential; Standing; Expected date: 10/03/2023  -     Comprehensive Metabolic Panel; Standing; Expected date: 10/03/2023  -     Sedimentation rate; Standing; Expected date: 10/03/2023  -     C-Reactive Protein; Standing; Expected date: 10/03/2023    Primary osteoarthritis involving multiple joints  -     sulfaSALAzine (AZULFIDINE) 500 mg Tab; 1,000mg in AM and 500mg at night. Follow handout for starting.  Dispense: 90 tablet; Refill: 3  -     CBC Auto Differential; Standing; Expected date: 10/03/2023  -     Comprehensive Metabolic Panel; Standing; Expected date: 10/03/2023  -     Sedimentation rate; Standing; Expected date: 10/03/2023  -     C-Reactive Protein; Standing; Expected date: 10/03/2023    Cubital tunnel syndrome on left        Absolutely delightful 67 year old female with sero+ RA   Ok to continue Tylenol for as needed pain  Keep taking Hydroxychloroquine  2.  Ok for Tylenol up to 2,500mg a day.     3.  Sulfasalazine start    Take 1 tablet by mouth daily for 4 days  Then take 1 tablet in the morning and 1 tablet at night for 4 days  Then take 2 tablets in the morning and 1 tablet at night for 4 days  Then take 2 tablets in the morning and 2 tablets at night and  continue this as tolerated.     Labs again at [facility] prior to next visit.     She will likely continue to have pain in knees as she has advanced OA here not sure how much HCQ will help on this level. Will consider SNRI at next visit       No follow-ups on file.        30min consultation with greater than 50% of that time included Preparing to see the patient (review records, tests), Obtaining and/or reviewing separately obtained historical data, Performing a medically appropriate examination and/or evaluation , Ordering medications, tests, and/or procedures, Referring and communicating with other healthcare professionals , Documenting clinical information in the electronic or other  health record and Independently interpreting results  (as warranted) & communicating results to the patient/family/caregiver. All questions answered.   Discussed the risks benefits and side effects of hydroxychloroquine including but not limited to retinal toxicity, rashes, nausea.  Patient is aware of the monitoring requirements of an annual eye exam will have this done following a trial to see if  tolerates the medication.

## 2024-01-02 ENCOUNTER — TELEPHONE (OUTPATIENT)
Dept: RHEUMATOLOGY | Facility: CLINIC | Age: 69
End: 2024-01-02
Payer: MEDICARE

## 2024-01-02 NOTE — TELEPHONE ENCOUNTER
----- Message from Elaina Ellis sent at 1/2/2024 11:04 AM CST -----  Regarding: reschedule appointment  Contact: patient  Type:  Sooner Appointment Request    Caller is requesting a sooner appointment.  Caller declined first available appointment listed below.  Caller will not accept being placed on the waitlist and is requesting a message be sent to doctor.    Name of Caller:  patient  When is the first available appointment?  01/10/24  Symptoms:    Would the patient rather a call back or a response via [facility]? call  Best Call Back Number: [phone]    Additional Information:  Patient states she needs to reschedule appointment due to having the flu.  Please call patient to advise.  Thanks!

## 2024-01-02 NOTE — TELEPHONE ENCOUNTER
S/w pt and rescheduled her appt as requested    Tamika Olguin MA  [facility]  1/2/2024

## 2024-01-31 ENCOUNTER — OFFICE VISIT (OUTPATIENT)
Dept: RHEUMATOLOGY | Facility: CLINIC | Age: 69
End: 2024-01-31
Payer: MEDICARE

## 2024-01-31 VITALS
SYSTOLIC BLOOD PRESSURE: 178 MMHG | BODY MASS INDEX: 49.49 KG/M2 | HEIGHT: 61 IN | WEIGHT: 262.13 LBS | DIASTOLIC BLOOD PRESSURE: 65 MMHG | HEART RATE: 75 BPM

## 2024-01-31 DIAGNOSIS — Z79.899 HIGH RISK MEDICATIONS (NOT ANTICOAGULANTS) LONG-TERM USE: ICD-10-CM

## 2024-01-31 DIAGNOSIS — M15.3 OTHER SECONDARY OSTEOARTHRITIS OF MULTIPLE SITES: ICD-10-CM

## 2024-01-31 DIAGNOSIS — M05.9 RHEUMATOID ARTHRITIS WITH POSITIVE RHEUMATOID FACTOR, INVOLVING UNSPECIFIED SITE: Primary | ICD-10-CM

## 2024-01-31 PROCEDURE — 3008F BODY MASS INDEX DOCD: CPT | Mod: CPTII,S$GLB,, | Performed by: INTERNAL MEDICINE

## 2024-01-31 PROCEDURE — 3077F SYST BP >= 140 MM HG: CPT | Mod: CPTII,S$GLB,, | Performed by: INTERNAL MEDICINE

## 2024-01-31 PROCEDURE — 99999 PR PBB SHADOW E&M-EST. PATIENT-LVL IV: CPT | Mod: PBBFAC,,, | Performed by: INTERNAL MEDICINE

## 2024-01-31 PROCEDURE — 1125F AMNT PAIN NOTED PAIN PRSNT: CPT | Mod: CPTII,S$GLB,, | Performed by: INTERNAL MEDICINE

## 2024-01-31 PROCEDURE — 3078F DIAST BP <80 MM HG: CPT | Mod: CPTII,S$GLB,, | Performed by: INTERNAL MEDICINE

## 2024-01-31 PROCEDURE — 1159F MED LIST DOCD IN RCRD: CPT | Mod: CPTII,S$GLB,, | Performed by: INTERNAL MEDICINE

## 2024-01-31 PROCEDURE — 1101F PT FALLS ASSESS-DOCD LE1/YR: CPT | Mod: CPTII,S$GLB,, | Performed by: INTERNAL MEDICINE

## 2024-01-31 PROCEDURE — 99214 OFFICE O/P EST MOD 30 MIN: CPT | Mod: S$GLB,,, | Performed by: INTERNAL MEDICINE

## 2024-01-31 PROCEDURE — 3288F FALL RISK ASSESSMENT DOCD: CPT | Mod: CPTII,S$GLB,, | Performed by: INTERNAL MEDICINE

## 2024-01-31 RX ORDER — FUROSEMIDE 40 MG/1
40 TABLET ORAL
COMMUNITY
Start: 2023-12-08 | End: 2024-04-17 | Stop reason: CLARIF

## 2024-01-31 RX ORDER — LOSARTAN POTASSIUM AND HYDROCHLOROTHIAZIDE 12.5; 1 MG/1; MG/1
TABLET ORAL
COMMUNITY
Start: 2023-11-21 | End: 2024-04-17 | Stop reason: CLARIF

## 2024-01-31 ASSESSMENT — ROUTINE ASSESSMENT OF PATIENT INDEX DATA (RAPID3)
FATIGUE SCORE: 1.1
PATIENT GLOBAL ASSESSMENT SCORE: 5.5
MDHAQ FUNCTION SCORE: 0.7
PAIN SCORE: 5
TOTAL RAPID3 SCORE: 4.28
PSYCHOLOGICAL DISTRESS SCORE: 0

## 2024-01-31 NOTE — PROGRESS NOTES
"Subjective:          Chief Complaint: Otto Al is a 68 y.o. female who had concerns including Disease Management.    HPI:  Interval     1/2024: Patient is doing well with addition of SSZ her ESR and CRP are trending downward no swelling in hands and wrist.     10/2023: started HCQ 8 months ago  Today, 10/2023, patient states she is not appreciating much benefit form HCQ, but tolerating.   Pain is 7/10     She is having a more diffuse pattern of arthritis shoulder but states "down the arm" some numbness across all 5 finger tips. She has motion about the shoulder, some tenderness between shoulder and elbow. Numbness does have some cubital distribution but is not positional. MCP, PIP and DIP joints not the painful.     Knees painful with standing, and full extension (pop fossa). She notes some days she has instability. Ambulated with cane.       2/2023: patient with high titer +RF with +SSA and no real sicca complaints. Most consistent with RA   Overall legs (knees, ankles, feet) are the worst. Knees with advanced OA.     Rheum Hx:   By her medical history she has congestive heart failure diabetes mellitus, obesity hypoventilation syndrome, and I do see that she is on allopurinol.   Patient was seen with Ms. Salazar (previously with Dr. Luke office) did have some labs and was referred to Rheumatology. To patients knowledge she was told she had:  Rheumatoid arthritis.   Patient has no prior diagnosis of SLE or RA .   No seizures, strokes, DVT, nephritis, renal stones, photosensitivity, rashes (malar, psoriasis, sclerodactyly), autoimmune mediated anemia. No alopecia. No dry eyes or dry mouth that has been an issue. Hx of Lumbar arthritis treated once with intervention.   Manages her joints mostly with Tylenol or juaquin ASA (tries only few times weekly as to not harm her kidneys).   No fmHx of SLE or RA.       Component      Latest Ref Rng & Units 1/9/2023   Rheumatoid Factor      0.0 - 15.0 IU/mL 164.6 (H)   CCP " Antibodies      <5.0 U/mL <0.5   Sed Rate      0 - 29 mm/Hr 67 (H)   CRP      0.00 - 0.90 mg/dL 0.70   Uric Acid      2.4 - 5.7 mg/dL 9.0 (H)         REVIEW OF SYSTEMS:    Review of Systems   Constitutional:  Negative for fever, malaise/fatigue and weight loss.   HENT:  Negative for sore throat.    Eyes:  Negative for double vision, photophobia and redness.   Respiratory:  Negative for cough, shortness of breath and wheezing.    Cardiovascular:  Negative for chest pain, palpitations and orthopnea.   Gastrointestinal:  Negative for abdominal pain, constipation and diarrhea.   Genitourinary:  Negative for dysuria, hematuria and urgency.   Musculoskeletal:  Positive for joint pain (knees) and myalgias. Negative for back pain.   Skin:  Negative for rash.   Neurological:  Negative for dizziness, tingling, focal weakness and headaches.   Endo/Heme/Allergies:  Does not bruise/bleed easily.   Psychiatric/Behavioral:  Negative for depression, hallucinations and suicidal ideas.                Objective:            Past Medical History:   Diagnosis Date    Biot's respiration     CHF (congestive heart failure)     Diabetes mellitus     Diastolic heart failure     Obesity hypoventilation syndrome     on home O2 at 2L    Obstructive sleep apnea     Thyroid disease      Family History   Problem Relation Age of Onset    Breast cancer Sister     Breast cancer Maternal Aunt      Social History     Tobacco Use    Smoking status: Never    Smokeless tobacco: Never   Substance Use Topics    Alcohol use: No    Drug use: No         Current Outpatient Medications on File Prior to Visit   Medication Sig Dispense Refill    albuterol sulfate 2.5 mg/0.5 mL Nebu Take 2.5 mg by nebulization every 4 (four) hours as needed (wheezing/shortness of breathe). Rescue      allopurinoL (ZYLOPRIM) 100 MG tablet Take 100 mg by mouth.      aspirin (ECOTRIN) 81 MG EC tablet Take 81 mg by mouth once daily.      cholecalciferol, vitamin D3, (VITAMIN D3) 5,000  unit Tab Take 10,000 Units by mouth once daily. Indications: low vitamin D levels      cyanocobalamin, vitamin B-12, 2,500 mcg Tab Take 5,000 mcg by mouth once daily.      furosemide (LASIX) 40 MG tablet Take 40 mg by mouth.      hydrALAZINE (APRESOLINE) 100 MG tablet Take 1 tablet (100 mg total) by mouth every 8 (eight) hours. 90 tablet 11    hydrOXYchloroQUINE (PLAQUENIL) 200 mg tablet Take 1 tablet (200 mg total) by mouth 2 (two) times daily. 60 tablet 6    JARDIANCE 10 mg tablet Take 10 mg by mouth.      levocetirizine (XYZAL) 5 MG tablet Take 5 mg by mouth every evening.      levothyroxine (SYNTHROID, LEVOTHROID) 175 MCG tablet Take 175 mcg by mouth before breakfast.      linaGLIPtin (TRADJENTA) 5 mg Tab tablet Take 5 mg by mouth once daily.      liothyronine (CYTOMEL) 25 MCG Tab Take 25 mcg by mouth Daily. Indications: a condition with low thyroid hormone levels      losartan-hydrochlorothiazide 100-12.5 mg (HYZAAR) 100-12.5 mg Tab       multivitamin capsule Take 1 capsule by mouth once daily.      nebivoloL (BYSTOLIC) 10 MG Tab Take 10 mg by mouth once daily.      OXYGEN-AIR DELIVERY SYSTEMS MISC 3 L/min by Nasal route continuous. 2-4 lpm  Indications: air hunger prevention       rosuvastatin (CRESTOR) 40 MG Tab Take 40 mg by mouth once daily.      semaglutide (OZEMPIC) 1 mg/dose (4 mg/3 mL) Inject 1 mg into the skin every Sunday.      sulfaSALAzine (AZULFIDINE) 500 mg Tab 1,000mg in AM and 500mg at night. Follow handout for starting. 90 tablet 3    zinc gluconate 50 mg tablet Take 50 mg by mouth once daily.      albuterol-ipratropium (DUO-NEB) 2.5 mg-0.5 mg/3 mL nebulizer solution Take 3 mLs by nebulization every 6 (six) hours as needed for Wheezing. Rescue (Patient not taking: Reported on 10/3/2023) 75 mL 0    amLODIPine (NORVASC) 10 MG tablet Take 1 tablet (10 mg total) by mouth once daily. (Patient not taking: Reported on 4/20/2023) 30 tablet 11    isosorbide mononitrate (IMDUR) 30 MG 24 hr tablet Take 1  tablet (30 mg total) by mouth every evening. (Patient not taking: Reported on 4/20/2023) 30 tablet 2    [DISCONTINUED] buPROPion (WELLBUTRIN SR) 150 MG TBSR 12 hr tablet Take 150 mg by mouth once daily.       No current facility-administered medications on file prior to visit.       Vitals:    01/31/24 1058   BP: (!) 178/65   Pulse: 75       Physical Exam:    Physical Exam  Constitutional:       Appearance: She is well-developed.   HENT:      Head: Normocephalic and atraumatic.   Eyes:      Pupils: Pupils are equal, round, and reactive to light.   Cardiovascular:      Rate and Rhythm: Normal rate and regular rhythm.      Heart sounds: Normal heart sounds.   Pulmonary:      Effort: Pulmonary effort is normal.      Breath sounds: Normal breath sounds.   Musculoskeletal:      Right shoulder: No swelling or tenderness. Normal range of motion.      Left shoulder: No swelling or tenderness. Normal range of motion.      Right elbow: No swelling. Normal range of motion. No tenderness.      Left elbow: No swelling. Normal range of motion. No tenderness.      Right wrist: No swelling or tenderness. Normal range of motion.      Left wrist: No swelling or tenderness. Normal range of motion.      Right hand: No swelling or tenderness. Normal range of motion.      Left hand: No swelling or tenderness. Normal range of motion.      Cervical back: Normal range of motion.      Right knee: No swelling. Normal range of motion. No tenderness.      Left knee: No swelling. Normal range of motion. No tenderness.      Right foot: Normal range of motion. No swelling or tenderness.      Left foot: Normal range of motion. No swelling or tenderness.      Comments: +crepitus bilateral knees    Skin:     General: Skin is warm and dry.   Neurological:      Mental Status: She is alert and oriented to person, place, and time.   Psychiatric:         Behavior: Behavior normal.               Assessment:       Encounter Diagnoses   Name Primary?     Rheumatoid arthritis with positive rheumatoid factor, involving unspecified site Yes    High risk medications (not anticoagulants) long-term use     Other secondary osteoarthritis of multiple sites                 Plan:        Rheumatoid arthritis with positive rheumatoid factor, involving unspecified site  -     CBC Auto Differential; Standing; Expected date: 01/31/2024  -     Comprehensive Metabolic Panel; Standing; Expected date: 01/31/2024  -     Sedimentation rate; Standing; Expected date: 01/31/2024  -     C-Reactive Protein; Standing; Expected date: 01/31/2024    High risk medications (not anticoagulants) long-term use  -     CBC Auto Differential; Standing; Expected date: 01/31/2024  -     Comprehensive Metabolic Panel; Standing; Expected date: 01/31/2024  -     Sedimentation rate; Standing; Expected date: 01/31/2024  -     C-Reactive Protein; Standing; Expected date: 01/31/2024    Other secondary osteoarthritis of multiple sites          Absolutely delightful 67 year old female with sero+ RA   Ok to continue Tylenol for as needed pain  Keep taking Hydroxychloroquine  2.  Ok for Tylenol up to 2,500mg a day.     3.  Sulfasalazine 1000mg AM, 500mg PM    Labs again at [facility] prior to next visit.     Left knee stable and managing.       Follow up in about 4 months (around 5/31/2024).        30min consultation with greater than 50% of that time included Preparing to see the patient (review records, tests), Obtaining and/or reviewing separately obtained historical data, Performing a medically appropriate examination and/or evaluation , Ordering medications, tests, and/or procedures, Referring and communicating with other healthcare professionals , Documenting clinical information in the electronic or other health record and Independently interpreting results  (as warranted) & communicating results to the patient/family/caregiver. All questions answered.   Discussed the risks benefits and side effects of  hydroxychloroquine including but not limited to retinal toxicity, rashes, nausea.  Patient is aware of the monitoring requirements of an annual eye exam will have this done following a trial to see if  tolerates the medication.

## 2024-04-17 PROBLEM — Z71.89 ACP (ADVANCE CARE PLANNING): Status: ACTIVE | Noted: 2024-04-17

## 2024-04-22 ENCOUNTER — TELEPHONE (OUTPATIENT)
Dept: NEPHROLOGY | Facility: CLINIC | Age: 69
End: 2024-04-22

## 2024-04-22 NOTE — TELEPHONE ENCOUNTER
I am sending this message to Katherine to schedule a np appt with .  would like to see the patient in 2 to 3 weeks.

## 2024-04-29 ENCOUNTER — TELEPHONE (OUTPATIENT)
Dept: NEPHROLOGY | Facility: CLINIC | Age: 69
End: 2024-04-29
Payer: MEDICARE

## 2024-04-29 ENCOUNTER — OFFICE VISIT (OUTPATIENT)
Dept: NEPHROLOGY | Facility: CLINIC | Age: 69
End: 2024-04-29
Payer: MEDICARE

## 2024-04-29 VITALS — SYSTOLIC BLOOD PRESSURE: 118 MMHG | HEART RATE: 68 BPM | OXYGEN SATURATION: 95 % | DIASTOLIC BLOOD PRESSURE: 66 MMHG

## 2024-04-29 DIAGNOSIS — G47.33 OSA ON CPAP: ICD-10-CM

## 2024-04-29 DIAGNOSIS — E66.01 MORBID OBESITY: ICD-10-CM

## 2024-04-29 DIAGNOSIS — I10 PRIMARY HYPERTENSION: ICD-10-CM

## 2024-04-29 DIAGNOSIS — M05.9 RHEUMATOID ARTHRITIS WITH POSITIVE RHEUMATOID FACTOR, INVOLVING UNSPECIFIED SITE: ICD-10-CM

## 2024-04-29 DIAGNOSIS — N18.4 CKD (CHRONIC KIDNEY DISEASE) STAGE 4, GFR 15-29 ML/MIN: Primary | ICD-10-CM

## 2024-04-29 DIAGNOSIS — E11.21 TYPE 2 DIABETES MELLITUS WITH DIABETIC NEPHROPATHY, WITH LONG-TERM CURRENT USE OF INSULIN: ICD-10-CM

## 2024-04-29 DIAGNOSIS — Z79.4 TYPE 2 DIABETES MELLITUS WITH DIABETIC NEPHROPATHY, WITH LONG-TERM CURRENT USE OF INSULIN: ICD-10-CM

## 2024-04-29 PROCEDURE — 3066F NEPHROPATHY DOC TX: CPT | Mod: CPTII,S$GLB,, | Performed by: STUDENT IN AN ORGANIZED HEALTH CARE EDUCATION/TRAINING PROGRAM

## 2024-04-29 PROCEDURE — 3288F FALL RISK ASSESSMENT DOCD: CPT | Mod: CPTII,S$GLB,, | Performed by: STUDENT IN AN ORGANIZED HEALTH CARE EDUCATION/TRAINING PROGRAM

## 2024-04-29 PROCEDURE — 3044F HG A1C LEVEL LT 7.0%: CPT | Mod: CPTII,S$GLB,, | Performed by: STUDENT IN AN ORGANIZED HEALTH CARE EDUCATION/TRAINING PROGRAM

## 2024-04-29 PROCEDURE — 1160F RVW MEDS BY RX/DR IN RCRD: CPT | Mod: CPTII,S$GLB,, | Performed by: STUDENT IN AN ORGANIZED HEALTH CARE EDUCATION/TRAINING PROGRAM

## 2024-04-29 PROCEDURE — 3078F DIAST BP <80 MM HG: CPT | Mod: CPTII,S$GLB,, | Performed by: STUDENT IN AN ORGANIZED HEALTH CARE EDUCATION/TRAINING PROGRAM

## 2024-04-29 PROCEDURE — 3074F SYST BP LT 130 MM HG: CPT | Mod: CPTII,S$GLB,, | Performed by: STUDENT IN AN ORGANIZED HEALTH CARE EDUCATION/TRAINING PROGRAM

## 2024-04-29 PROCEDURE — 1101F PT FALLS ASSESS-DOCD LE1/YR: CPT | Mod: CPTII,S$GLB,, | Performed by: STUDENT IN AN ORGANIZED HEALTH CARE EDUCATION/TRAINING PROGRAM

## 2024-04-29 PROCEDURE — 1111F DSCHRG MED/CURRENT MED MERGE: CPT | Mod: CPTII,S$GLB,, | Performed by: STUDENT IN AN ORGANIZED HEALTH CARE EDUCATION/TRAINING PROGRAM

## 2024-04-29 PROCEDURE — 99999 PR PBB SHADOW E&M-EST. PATIENT-LVL III: CPT | Mod: PBBFAC,,, | Performed by: STUDENT IN AN ORGANIZED HEALTH CARE EDUCATION/TRAINING PROGRAM

## 2024-04-29 PROCEDURE — 99214 OFFICE O/P EST MOD 30 MIN: CPT | Mod: S$GLB,,, | Performed by: STUDENT IN AN ORGANIZED HEALTH CARE EDUCATION/TRAINING PROGRAM

## 2024-04-29 PROCEDURE — 1159F MED LIST DOCD IN RCRD: CPT | Mod: CPTII,S$GLB,, | Performed by: STUDENT IN AN ORGANIZED HEALTH CARE EDUCATION/TRAINING PROGRAM

## 2024-04-29 RX ORDER — EMPAGLIFLOZIN 10 MG/1
10 TABLET, FILM COATED ORAL DAILY
Qty: 30 TABLET | Refills: 11 | Status: SHIPPED | OUTPATIENT
Start: 2024-04-29 | End: 2025-04-24

## 2024-04-29 NOTE — PATIENT INSTRUCTIONS
I put together a guide for patients which includes some healthy tips from the [employer]:   Avoiding fluctuations in blood pressure (high and low spikes)  Maintain good glucose control if you have diabetes  Reduce/avoid extra salt intake  Avoid over the counter pain medications (NSAIDs)  Aerobic exercise at least 3x weekly as tolerated  Eat more whole foods (vegetables, fruit, fish, chicken, pork, beef, dairy) and less processed food  Control weight  Avoid smoking  Stay hydrated, avoid dehydration.  Annual flu shot and routine preventative cancer screening via your primary care doctor.     -Vinnie Diaz MD

## 2024-04-29 NOTE — PROGRESS NOTES
Subjective:       Patient ID: Otto Al is a 68 y.o. Black or  female who presents for new patient evaluation for chronic renal failure. She was previously seen by me in another clinic setting.  She has a pertinent medical history of CHF, morbid obesity, hypertension with prior admissions for hypertensive emergency, rheumatoid arthritis, COPD with home oxygen use at baseline, DM type 2, and chronic kidney disease stage IIIB.  She underwent renal biopsy in April of 2023 which was consistent with severe hypertensive and diabetic nephropathy, 50% glomerular sclerosis 50% interstitial fibrosis and 40% tubular atrophy.     She has no uremic or urinary symptoms and is in her usual state of health.      She was recently discharged from [facility] for LEANNE on CKD d/t CRS. It appears she also had background progression of her CKD as well from CKDIIIb to CKDIV over the last 6 months. We will send her to the CKD education team today for CKD basics and CKD-modalities education.       Review of Systems   Constitutional:  Negative for chills, diaphoresis and fever.   Respiratory:  Negative for cough and shortness of breath.    Cardiovascular:  Negative for chest pain and leg swelling.   Gastrointestinal:  Negative for abdominal pain, diarrhea, nausea and vomiting.   Genitourinary:  Negative for difficulty urinating, dysuria and hematuria.   Musculoskeletal:  Negative for myalgias.   Neurological:  Negative for headaches.   Hematological:  Does not bruise/bleed easily.   All other systems reviewed and are negative.      The past medical, family and social histories were reviewed for this encounter.     Past Medical History:   Diagnosis Date    Biot's respiration 2016    CHF (congestive heart failure)     Diabetes mellitus     Diastolic heart failure     Hypertension     Obesity hypoventilation syndrome     on home O2 at 2L    Obstructive sleep apnea     Renal disorder 2023    Stage IV    Thyroid disease      Past  Surgical History:   Procedure Laterality Date    DILATION AND CURETTAGE OF UTERUS       Social History     Socioeconomic History    Marital status:    Tobacco Use    Smoking status: Never    Smokeless tobacco: Never   Substance and Sexual Activity    Alcohol use: Never    Drug use: No     Social Determinants of Health     Food Insecurity: No Food Insecurity (4/19/2024)    Hunger Vital Sign     Worried About Running Out of Food in the Last Year: Never true     Ran Out of Food in the Last Year: Never true   Transportation Needs: No Transportation Needs (4/19/2024)    PRAPARE - Transportation     Lack of Transportation (Medical): No     Lack of Transportation (Non-Medical): No   Social Connections: Feeling Socially Integrated (5/18/2023)    OASIS : Social Isolation     Frequency of experiencing loneliness or isolation: Never   Housing Stability: Low Risk  (4/19/2024)    Housing Stability Vital Sign     Unable to Pay for Housing in the Last Year: No     Number of Times Moved in the Last Year: 1     Homeless in the Last Year: No     Current Outpatient Medications   Medication Sig Dispense Refill    acetaminophen (TYLENOL) 325 MG tablet Take 2 tablets (650 mg total) by mouth every 4 (four) hours as needed.      albuterol sulfate 2.5 mg/0.5 mL Nebu Take 2.5 mg by nebulization every 4 (four) hours as needed (wheezing/shortness of breathe). Rescue      albuterol-ipratropium (DUO-NEB) 2.5 mg-0.5 mg/3 mL nebulizer solution Take 3 mLs by nebulization every 6 (six) hours as needed for Wheezing. Rescue 75 mL 0    allopurinoL (ZYLOPRIM) 100 MG tablet Take 1 tablet (100 mg total) by mouth once daily. 30 tablet 3    aspirin (ECOTRIN) 81 MG EC tablet Take 81 mg by mouth once daily.      cholecalciferol, vitamin D3, (VITAMIN D3) 5,000 unit Tab Take 10,000 Units by mouth once daily. Indications: low vitamin D levels      cyanocobalamin, vitamin B-12, 2,500 mcg Tab Take 5,000 mcg by mouth once daily.      furosemide (LASIX)  40 MG tablet Take 1 tablet (40 mg total) by mouth once daily. 30 tablet 11    hydrALAZINE (APRESOLINE) 100 MG tablet Take 1 tablet (100 mg total) by mouth every 8 (eight) hours. 90 tablet 11    isosorbide mononitrate (IMDUR) 30 MG 24 hr tablet Take 1 tablet (30 mg total) by mouth every evening. 30 tablet 2    JARDIANCE 10 mg tablet Take 10 mg by mouth.      levocetirizine (XYZAL) 5 MG tablet Take 5 mg by mouth every evening.      levothyroxine (SYNTHROID, LEVOTHROID) 175 MCG tablet Take 1 tablet (175 mcg total) by mouth before breakfast. 30 tablet 11    linaGLIPtin (TRADJENTA) 5 mg Tab tablet Take 5 mg by mouth once daily.      liothyronine (CYTOMEL) 25 MCG Tab Take 25 mcg by mouth Daily. Indications: a condition with low thyroid hormone levels      multivitamin capsule Take 1 capsule by mouth once daily.      nebivoloL (BYSTOLIC) 10 MG Tab Take 10 mg by mouth once daily.      OXYGEN-AIR DELIVERY SYSTEMS MISC 3 L/min by Nasal route continuous. 2-4 lpm  Indications: air hunger prevention       rosuvastatin (CRESTOR) 40 MG Tab Take 40 mg by mouth once daily.      semaglutide (OZEMPIC) 1 mg/dose (4 mg/3 mL) Inject 1 mg into the skin every Sunday.      sulfaSALAzine (AZULFIDINE) 500 mg Tab 1,000mg in AM and 500mg at night. Follow handout for starting. 90 tablet 3    zinc gluconate 50 mg tablet Take 50 mg by mouth once daily.      tiotropium-olodateroL (STIOLTO RESPIMAT) 2.5-2.5 mcg/actuation Mist Inhale 2 puffs into the lungs Daily. Controller (Patient not taking: Reported on 4/29/2024) 4 g 2     No current facility-administered medications for this visit.     /66 (BP Location: Left forearm, Patient Position: Sitting, BP Method: Large (Manual))   Pulse 68   LMP  (LMP Unknown)   SpO2 95%     Objective:      Physical Exam  Vitals reviewed.   Constitutional:       General: She is not in acute distress.     Appearance: She is obese. She is not ill-appearing.   HENT:      Head: Normocephalic and atraumatic.       Right Ear: External ear normal.      Left Ear: External ear normal.      Nose: Nose normal. No congestion.      Mouth/Throat:      Mouth: Mucous membranes are moist.      Pharynx: Oropharynx is clear. No oropharyngeal exudate or posterior oropharyngeal erythema.   Eyes:      General: No scleral icterus.     Extraocular Movements: Extraocular movements intact.   Cardiovascular:      Rate and Rhythm: Normal rate and regular rhythm.      Pulses: Normal pulses.      Heart sounds: Normal heart sounds.      No friction rub.   Pulmonary:      Effort: Pulmonary effort is normal. No respiratory distress.      Breath sounds: Normal breath sounds.   Abdominal:      General: Abdomen is flat.      Palpations: Abdomen is soft.   Musculoskeletal:         General: No swelling.      Cervical back: Normal range of motion. No tenderness.      Right lower leg: No edema.      Left lower leg: No edema.   Neurological:      General: No focal deficit present.      Mental Status: She is oriented to person, place, and time.      Motor: No weakness.   Psychiatric:         Mood and Affect: Mood normal.         Behavior: Behavior normal.         Assessment:     Lab Results   Component Value Date    CREATININE 3.01 (H) 04/22/2024     (H) 04/22/2024     04/22/2024    K 4.1 04/22/2024     04/22/2024    CO2 27 04/22/2024     Lab Results   Component Value Date    .5 (H) 05/15/2023    CALCIUM 8.9 04/22/2024    PHOS 5.3 (H) 04/22/2024     Lab Results   Component Value Date    HCT 44.1 04/20/2024     Prot/Creat Ratio, Urine   Date Value Ref Range Status   04/08/2023 4466.5 (H) 10.0 - 107.0 mg/g Final       Lab Results   Component Value Date    MICALBCREAT Unable to calculate 10/12/2023    MICALBCREAT Unable to calculate 04/08/2023    MICALBCREAT 8.7 11/03/2011           1. CKD (chronic kidney disease) stage 4, GFR 15-29 ml/min    2. Type 2 diabetes mellitus with diabetic nephropathy, with long-term current use of insulin    3.  Primary hypertension    4. OLIVIER on CPAP    5. Morbid obesity    6. Rheumatoid arthritis with positive rheumatoid factor, involving unspecified site        Plan:   Return to clinic in 2 months.  Labs for next visit include rfp, uacr, upcr, pth.  Baseline creatinine is 3.1-3.3mg/dL.    CKD stage IV   Risk: known biopsy proven hypertensive nephrosclerosis and diabetic nephropathy  on SGLT2-I for CKD-MACE risk reduction, proteinuria reduction and compa-protection  Also on GLP1a which has demonstrated renal protective effects (FLOW TRIAL)  Not tolerating RASI d/t recurrent hyperkalemia  Send for options education today  Encourage fluid intake to avoid dehydration.     DM type II - f/u with pcp to ensure tighter glucose control.     Hypertension-at goal today.  Continue current medications.    Morbid obesity-We discussed making lifestyle changes and using a Mediterranean diet for health benefits and weight loss.  This diet also is beneficial in improving HTN, DM, protein in urine as well as kidney function.  We discussed aerobic exercise (walking for 30 min) at least 3x weekly as tolerated. On GLP1a as well.     Rheumatoid arthritis - on sulfasalazine      MD Natalya [facility] - [place]

## 2024-05-03 ENCOUNTER — TELEPHONE (OUTPATIENT)
Dept: NEPHROLOGY | Facility: CLINIC | Age: 69
End: 2024-05-03
Payer: MEDICARE

## 2024-05-10 ENCOUNTER — TELEPHONE (OUTPATIENT)
Dept: NEPHROLOGY | Facility: CLINIC | Age: 69
End: 2024-05-10
Payer: MEDICARE

## 2024-05-17 ENCOUNTER — TELEPHONE (OUTPATIENT)
Dept: NEPHROLOGY | Facility: CLINIC | Age: 69
End: 2024-05-17
Payer: MEDICARE

## 2024-05-31 ENCOUNTER — OFFICE VISIT (OUTPATIENT)
Dept: RHEUMATOLOGY | Facility: CLINIC | Age: 69
End: 2024-05-31
Payer: MEDICARE

## 2024-05-31 VITALS
DIASTOLIC BLOOD PRESSURE: 63 MMHG | HEART RATE: 69 BPM | WEIGHT: 268.63 LBS | HEIGHT: 61 IN | BODY MASS INDEX: 50.72 KG/M2 | SYSTOLIC BLOOD PRESSURE: 114 MMHG

## 2024-05-31 DIAGNOSIS — Z79.899 HIGH RISK MEDICATIONS (NOT ANTICOAGULANTS) LONG-TERM USE: ICD-10-CM

## 2024-05-31 DIAGNOSIS — M05.9 RHEUMATOID ARTHRITIS WITH POSITIVE RHEUMATOID FACTOR, INVOLVING UNSPECIFIED SITE: Primary | ICD-10-CM

## 2024-05-31 PROCEDURE — 99214 OFFICE O/P EST MOD 30 MIN: CPT | Mod: S$PBB,,, | Performed by: INTERNAL MEDICINE

## 2024-05-31 PROCEDURE — 99214 OFFICE O/P EST MOD 30 MIN: CPT | Mod: PBBFAC,PN | Performed by: INTERNAL MEDICINE

## 2024-05-31 PROCEDURE — 99999 PR PBB SHADOW E&M-EST. PATIENT-LVL IV: CPT | Mod: PBBFAC,,, | Performed by: INTERNAL MEDICINE

## 2024-05-31 PROCEDURE — 3066F NEPHROPATHY DOC TX: CPT | Mod: ,,, | Performed by: INTERNAL MEDICINE

## 2024-05-31 ASSESSMENT — ROUTINE ASSESSMENT OF PATIENT INDEX DATA (RAPID3)
PAIN SCORE: 6
FATIGUE SCORE: 1.1
PATIENT GLOBAL ASSESSMENT SCORE: 7
TOTAL RAPID3 SCORE: 5.11
MDHAQ FUNCTION SCORE: 0.7
PSYCHOLOGICAL DISTRESS SCORE: 0

## 2024-05-31 NOTE — PROGRESS NOTES
"Subjective:          Chief Complaint: Otto Al is a 68 y.o. female who had concerns including Disease Management.    HPI:  Interval     1/2024: Patient is doing well with addition of SSZ her ESR and CRP are trending downward no swelling in hands and wrist.     10/2023: started HCQ 8 months ago  Today, 10/2023, patient states she is not appreciating much benefit form HCQ, but tolerating.   Pain is 7/10     She is having a more diffuse pattern of arthritis shoulder but states "down the arm" some numbness across all 5 finger tips. She has motion about the shoulder, some tenderness between shoulder and elbow. Numbness does have some cubital distribution but is not positional. MCP, PIP and DIP joints not the painful.     Knees painful with standing, and full extension (pop fossa). She notes some days she has instability. Ambulated with cane.       2/2023: patient with high titer +RF with +SSA and no real sicca complaints. Most consistent with RA   Overall legs (knees, ankles, feet) are the worst. Knees with advanced OA.     Rheum Hx:   By her medical history she has congestive heart failure diabetes mellitus, obesity hypoventilation syndrome, and I do see that she is on allopurinol.   Patient was seen with Ms. Salazar (previously with Dr. Luke office) did have some labs and was referred to Rheumatology. To patients knowledge she was told she had:  Rheumatoid arthritis.   Patient has no prior diagnosis of SLE or RA .   No seizures, strokes, DVT, nephritis, renal stones, photosensitivity, rashes (malar, psoriasis, sclerodactyly), autoimmune mediated anemia. No alopecia. No dry eyes or dry mouth that has been an issue. Hx of Lumbar arthritis treated once with intervention.   Manages her joints mostly with Tylenol or juaquin ASA (tries only few times weekly as to not harm her kidneys).   No fmHx of SLE or RA.       Component      Latest Ref Rng & Units 1/9/2023   Rheumatoid Factor      0.0 - 15.0 IU/mL 164.6 (H)   CCP " Antibodies      <5.0 U/mL <0.5   Sed Rate      0 - 29 mm/Hr 67 (H)   CRP      0.00 - 0.90 mg/dL 0.70   Uric Acid      2.4 - 5.7 mg/dL 9.0 (H)         REVIEW OF SYSTEMS:    Review of Systems   Constitutional:  Negative for fever, malaise/fatigue and weight loss.   HENT:  Negative for sore throat.    Eyes:  Negative for double vision, photophobia and redness.   Respiratory:  Negative for cough, shortness of breath and wheezing.    Cardiovascular:  Negative for chest pain, palpitations and orthopnea.   Gastrointestinal:  Negative for abdominal pain, constipation and diarrhea.   Genitourinary:  Negative for dysuria, hematuria and urgency.   Musculoskeletal:  Positive for joint pain (knees) and myalgias. Negative for back pain.   Skin:  Negative for rash.   Neurological:  Negative for dizziness, tingling, focal weakness and headaches.   Endo/Heme/Allergies:  Does not bruise/bleed easily.   Psychiatric/Behavioral:  Negative for depression, hallucinations and suicidal ideas.                Objective:            Past Medical History:   Diagnosis Date    Biot's respiration 2016    CHF (congestive heart failure)     Diabetes mellitus     Diastolic heart failure     Hypertension     Obesity hypoventilation syndrome     on home O2 at 2L    Obstructive sleep apnea     Renal disorder 2023    Stage IV    Thyroid disease      Family History   Problem Relation Name Age of Onset    Kidney disease Mother      Heart disease Mother      Heart disease Father      Breast cancer Sister      Breast cancer Maternal Aunt       Social History     Tobacco Use    Smoking status: Never    Smokeless tobacco: Never   Substance Use Topics    Alcohol use: Never    Drug use: No         Current Outpatient Medications on File Prior to Visit   Medication Sig Dispense Refill    acetaminophen (TYLENOL) 325 MG tablet Take 2 tablets (650 mg total) by mouth every 4 (four) hours as needed.      albuterol sulfate 2.5 mg/0.5 mL Nebu Take 2.5 mg by nebulization  every 4 (four) hours as needed (wheezing/shortness of breathe). Rescue      albuterol-ipratropium (DUO-NEB) 2.5 mg-0.5 mg/3 mL nebulizer solution Take 3 mLs by nebulization every 6 (six) hours as needed for Wheezing. Rescue 75 mL 0    allopurinoL (ZYLOPRIM) 100 MG tablet Take 1 tablet (100 mg total) by mouth once daily. 30 tablet 3    aspirin (ECOTRIN) 81 MG EC tablet Take 81 mg by mouth once daily.      cholecalciferol, vitamin D3, (VITAMIN D3) 5,000 unit Tab Take 10,000 Units by mouth once daily. Indications: low vitamin D levels      cyanocobalamin, vitamin B-12, 2,500 mcg Tab Take 5,000 mcg by mouth once daily.      furosemide (LASIX) 40 MG tablet Take 1 tablet (40 mg total) by mouth once daily. 30 tablet 11    JARDIANCE 10 mg tablet Take 1 tablet (10 mg total) by mouth once daily. 30 tablet 11    levocetirizine (XYZAL) 5 MG tablet Take 5 mg by mouth every evening.      levothyroxine (SYNTHROID, LEVOTHROID) 175 MCG tablet Take 1 tablet (175 mcg total) by mouth before breakfast. 30 tablet 11    linaGLIPtin (TRADJENTA) 5 mg Tab tablet Take 5 mg by mouth once daily.      liothyronine (CYTOMEL) 25 MCG Tab Take 25 mcg by mouth Daily. Indications: a condition with low thyroid hormone levels      multivitamin capsule Take 1 capsule by mouth once daily.      nebivoloL (BYSTOLIC) 10 MG Tab Take 10 mg by mouth once daily.      OXYGEN-AIR DELIVERY SYSTEMS MISC 3 L/min by Nasal route continuous. 2-4 lpm  Indications: air hunger prevention       rosuvastatin (CRESTOR) 40 MG Tab Take 40 mg by mouth once daily.      semaglutide (OZEMPIC) 1 mg/dose (4 mg/3 mL) Inject 1 mg into the skin every Sunday.      sulfaSALAzine (AZULFIDINE) 500 mg Tab 1,000mg in AM and 500mg at night. Follow handout for starting. 90 tablet 3    tiotropium-olodateroL (STIOLTO RESPIMAT) 2.5-2.5 mcg/actuation Mist Inhale 2 puffs into the lungs Daily. Controller 4 g 2    zinc gluconate 50 mg tablet Take 50 mg by mouth once daily.      hydrALAZINE  (APRESOLINE) 100 MG tablet Take 1 tablet (100 mg total) by mouth every 8 (eight) hours. 90 tablet 11    isosorbide mononitrate (IMDUR) 30 MG 24 hr tablet Take 1 tablet (30 mg total) by mouth every evening. 30 tablet 2    [DISCONTINUED] buPROPion (WELLBUTRIN SR) 150 MG TBSR 12 hr tablet Take 150 mg by mouth once daily.       No current facility-administered medications on file prior to visit.       Vitals:    05/31/24 1159   BP: 114/63   Pulse: 69       Physical Exam:    Physical Exam  Constitutional:       Appearance: She is well-developed.   HENT:      Head: Normocephalic and atraumatic.   Eyes:      Pupils: Pupils are equal, round, and reactive to light.   Cardiovascular:      Rate and Rhythm: Normal rate and regular rhythm.      Heart sounds: Normal heart sounds.   Pulmonary:      Effort: Pulmonary effort is normal.      Breath sounds: Normal breath sounds.   Musculoskeletal:      Right shoulder: No swelling or tenderness. Normal range of motion.      Left shoulder: No swelling or tenderness. Normal range of motion.      Right elbow: No swelling. Normal range of motion. No tenderness.      Left elbow: No swelling. Normal range of motion. No tenderness.      Right wrist: No swelling or tenderness. Normal range of motion.      Left wrist: No swelling or tenderness. Normal range of motion.      Right hand: No swelling or tenderness. Normal range of motion.      Left hand: No swelling or tenderness. Normal range of motion.      Cervical back: Normal range of motion.      Right knee: No swelling. Normal range of motion. No tenderness.      Left knee: No swelling. Normal range of motion. No tenderness.      Right foot: Normal range of motion. No swelling or tenderness.      Left foot: Normal range of motion. No swelling or tenderness.      Comments: +crepitus bilateral knees    Skin:     General: Skin is warm and dry.   Neurological:      Mental Status: She is alert and oriented to person, place, and time.    Psychiatric:         Behavior: Behavior normal.               Assessment:       Encounter Diagnoses   Name Primary?    Rheumatoid arthritis with positive rheumatoid factor, involving unspecified site Yes    High risk medications (not anticoagulants) long-term use              Plan:        Rheumatoid arthritis with positive rheumatoid factor, involving unspecified site  -     ALT (SGPT); Future; Expected date: 05/31/2024  -     AST (SGOT); Future; Expected date: 05/31/2024  -     CBC Auto Differential; Future; Expected date: 05/31/2024  -     C-Reactive Protein; Future; Expected date: 05/31/2024  -     Sedimentation rate; Future; Expected date: 05/31/2024    High risk medications (not anticoagulants) long-term use  -     ALT (SGPT); Future; Expected date: 05/31/2024  -     AST (SGOT); Future; Expected date: 05/31/2024  -     CBC Auto Differential; Future; Expected date: 05/31/2024  -     C-Reactive Protein; Future; Expected date: 05/31/2024  -     Sedimentation rate; Future; Expected date: 05/31/2024        Absolutely delightful 68 year old female with sero+ RA   Ok to continue Tylenol for as needed pain  Remain off HCQ for now.   2.  Ok for Tylenol up to 2,500mg a day.     3.  Sulfasalazine 1000mg AM, 500mg PM    Labs [facility] at [facility] prior to next visit.     Left knee stable and managing.       No follow-ups on file.        30min consultation with greater than 50% of that time included Preparing to see the patient (review records, tests), Obtaining and/or reviewing separately obtained historical data, Performing a medically appropriate examination and/or evaluation , Ordering medications, tests, and/or procedures, Referring and communicating with other healthcare professionals , Documenting clinical information in the electronic or other health record and Independently interpreting results  (as warranted) & communicating results to the patient/family/caregiver. All questions answered.   Discussed the risks  benefits and side effects of hydroxychloroquine including but not limited to retinal toxicity, rashes, nausea.  Patient is aware of the monitoring requirements of an annual eye exam will have this done following a trial to see if  tolerates the medication.

## 2024-07-09 ENCOUNTER — OFFICE VISIT (OUTPATIENT)
Dept: NEPHROLOGY | Facility: CLINIC | Age: 69
End: 2024-07-09
Payer: MEDICARE

## 2024-07-09 VITALS
OXYGEN SATURATION: 94 % | HEART RATE: 66 BPM | SYSTOLIC BLOOD PRESSURE: 140 MMHG | BODY MASS INDEX: 50.76 KG/M2 | DIASTOLIC BLOOD PRESSURE: 68 MMHG | HEIGHT: 61 IN

## 2024-07-09 DIAGNOSIS — E11.21 TYPE 2 DIABETES MELLITUS WITH DIABETIC NEPHROPATHY, WITH LONG-TERM CURRENT USE OF INSULIN: ICD-10-CM

## 2024-07-09 DIAGNOSIS — E66.01 MORBID OBESITY: ICD-10-CM

## 2024-07-09 DIAGNOSIS — Z79.4 TYPE 2 DIABETES MELLITUS WITH DIABETIC NEPHROPATHY, WITH LONG-TERM CURRENT USE OF INSULIN: ICD-10-CM

## 2024-07-09 DIAGNOSIS — M05.9 RHEUMATOID ARTHRITIS WITH POSITIVE RHEUMATOID FACTOR, INVOLVING UNSPECIFIED SITE: ICD-10-CM

## 2024-07-09 DIAGNOSIS — N25.81 SECONDARY HYPERPARATHYROIDISM OF RENAL ORIGIN: ICD-10-CM

## 2024-07-09 DIAGNOSIS — N18.4 CKD (CHRONIC KIDNEY DISEASE) STAGE 4, GFR 15-29 ML/MIN: Primary | ICD-10-CM

## 2024-07-09 DIAGNOSIS — G47.33 OSA ON CPAP: ICD-10-CM

## 2024-07-09 DIAGNOSIS — I10 PRIMARY HYPERTENSION: ICD-10-CM

## 2024-07-09 PROCEDURE — 99999 PR PBB SHADOW E&M-EST. PATIENT-LVL IV: CPT | Mod: PBBFAC,,, | Performed by: STUDENT IN AN ORGANIZED HEALTH CARE EDUCATION/TRAINING PROGRAM

## 2024-07-09 PROCEDURE — 3066F NEPHROPATHY DOC TX: CPT | Mod: CPTII,S$GLB,, | Performed by: STUDENT IN AN ORGANIZED HEALTH CARE EDUCATION/TRAINING PROGRAM

## 2024-07-09 PROCEDURE — 99214 OFFICE O/P EST MOD 30 MIN: CPT | Mod: S$GLB,,, | Performed by: STUDENT IN AN ORGANIZED HEALTH CARE EDUCATION/TRAINING PROGRAM

## 2024-07-09 PROCEDURE — 3077F SYST BP >= 140 MM HG: CPT | Mod: CPTII,S$GLB,, | Performed by: STUDENT IN AN ORGANIZED HEALTH CARE EDUCATION/TRAINING PROGRAM

## 2024-07-09 PROCEDURE — 3062F POS MACROALBUMINURIA REV: CPT | Mod: CPTII,S$GLB,, | Performed by: STUDENT IN AN ORGANIZED HEALTH CARE EDUCATION/TRAINING PROGRAM

## 2024-07-09 PROCEDURE — 1159F MED LIST DOCD IN RCRD: CPT | Mod: CPTII,S$GLB,, | Performed by: STUDENT IN AN ORGANIZED HEALTH CARE EDUCATION/TRAINING PROGRAM

## 2024-07-09 PROCEDURE — 3078F DIAST BP <80 MM HG: CPT | Mod: CPTII,S$GLB,, | Performed by: STUDENT IN AN ORGANIZED HEALTH CARE EDUCATION/TRAINING PROGRAM

## 2024-07-09 PROCEDURE — 3288F FALL RISK ASSESSMENT DOCD: CPT | Mod: CPTII,S$GLB,, | Performed by: STUDENT IN AN ORGANIZED HEALTH CARE EDUCATION/TRAINING PROGRAM

## 2024-07-09 PROCEDURE — 3044F HG A1C LEVEL LT 7.0%: CPT | Mod: CPTII,S$GLB,, | Performed by: STUDENT IN AN ORGANIZED HEALTH CARE EDUCATION/TRAINING PROGRAM

## 2024-07-09 PROCEDURE — 1101F PT FALLS ASSESS-DOCD LE1/YR: CPT | Mod: CPTII,S$GLB,, | Performed by: STUDENT IN AN ORGANIZED HEALTH CARE EDUCATION/TRAINING PROGRAM

## 2024-07-09 PROCEDURE — 3008F BODY MASS INDEX DOCD: CPT | Mod: CPTII,S$GLB,, | Performed by: STUDENT IN AN ORGANIZED HEALTH CARE EDUCATION/TRAINING PROGRAM

## 2024-07-09 NOTE — PROGRESS NOTES
Subjective:       Patient ID: Otto Al is a 68 y.o.  female who presents for new patient evaluation for chronic renal failure. She was previously seen by me in another clinic setting.  She has a pertinent medical history of CHF, morbid obesity, hypertension with prior admissions for hypertensive emergency, rheumatoid arthritis, COPD with home oxygen use at baseline, DM type 2, and chronic kidney disease stage IIIB.  She underwent renal biopsy in April of 2023 which was consistent with severe hypertensive and diabetic nephropathy, 50% glomerular sclerosis 50% interstitial fibrosis and 40% tubular atrophy.     She has no uremic or urinary symptoms and is in her usual state of health.      She was recently discharged from [facility] for LEANNE on CKD d/t CRS. It appears she also had background progression of her CKD as well from CKDIIIb to CKDIV over the last 6 months. We will send her to the CKD education team today for CKD basics and CKD-modalities education.     Interval history:  7/9//24 clinic visit- patient here for ongoing evaluation and management of CKD. She is doing well today and denies acute medical complaints. She has no uremic or urinary symptoms and is in her usual state of health. Losing weight with semaglutide and feeling well.  We reviewed her lab trends at chairside. She is making better improvements into controlling her DM. sCr has improved to 2.2mg/dL. Proteinuria is trending down.       Review of Systems   Constitutional:  Negative for chills, diaphoresis and fever.   Respiratory:  Negative for cough and shortness of breath.    Cardiovascular:  Negative for chest pain and leg swelling.   Gastrointestinal:  Negative for abdominal pain, diarrhea, nausea and vomiting.   Genitourinary:  Negative for difficulty urinating, dysuria and hematuria.   Musculoskeletal:  Negative for myalgias.   Neurological:  Negative for headaches.   Hematological:  Does not bruise/bleed easily.   All other systems reviewed  and are negative.      The past medical, family and social histories were reviewed for this encounter.     Past Medical History:   Diagnosis Date    Biot's respiration 2016    CHF (congestive heart failure)     Diabetes mellitus     Diastolic heart failure     Hypertension     Obesity hypoventilation syndrome     on home O2 at 2L    Obstructive sleep apnea     Renal disorder 2023    Stage IV    Thyroid disease        Current Outpatient Medications   Medication Sig    acetaminophen (TYLENOL) 325 MG tablet Take 2 tablets (650 mg total) by mouth every 4 (four) hours as needed.    albuterol sulfate 2.5 mg/0.5 mL Nebu Take 2.5 mg by nebulization every 4 (four) hours as needed (wheezing/shortness of breathe). Rescue    albuterol-ipratropium (DUO-NEB) 2.5 mg-0.5 mg/3 mL nebulizer solution Take 3 mLs by nebulization every 6 (six) hours as needed for Wheezing. Rescue    allopurinoL (ZYLOPRIM) 100 MG tablet Take 1 tablet (100 mg total) by mouth once daily.    aspirin (ECOTRIN) 81 MG EC tablet Take 81 mg by mouth once daily.    cholecalciferol, vitamin D3, (VITAMIN D3) 5,000 unit Tab Take 10,000 Units by mouth once daily. Indications: low vitamin D levels    cyanocobalamin, vitamin B-12, 2,500 mcg Tab Take 5,000 mcg by mouth once daily.    furosemide (LASIX) 40 MG tablet Take 1 tablet (40 mg total) by mouth once daily.    hydrALAZINE (APRESOLINE) 100 MG tablet Take 1 tablet (100 mg total) by mouth every 8 (eight) hours.    JARDIANCE 10 mg tablet Take 1 tablet (10 mg total) by mouth once daily.    levocetirizine (XYZAL) 5 MG tablet Take 5 mg by mouth every evening.    levothyroxine (SYNTHROID, LEVOTHROID) 175 MCG tablet Take 1 tablet (175 mcg total) by mouth before breakfast.    linaGLIPtin (TRADJENTA) 5 mg Tab tablet Take 5 mg by mouth once daily.    liothyronine (CYTOMEL) 25 MCG Tab Take 25 mcg by mouth Daily. Indications: a condition with low thyroid hormone levels    multivitamin capsule Take 1 capsule by mouth once daily.  "   nebivoloL (BYSTOLIC) 10 MG Tab Take 10 mg by mouth once daily.    OXYGEN-AIR DELIVERY SYSTEMS MISC 3 L/min by Nasal route continuous. 2-4 lpm  Indications: air hunger prevention     rosuvastatin (CRESTOR) 40 MG Tab Take 40 mg by mouth once daily.    semaglutide (OZEMPIC) 1 mg/dose (4 mg/3 mL) Inject 1 mg into the skin every Sunday.    sulfaSALAzine (AZULFIDINE) 500 mg Tab 1,000mg in AM and 500mg at night. Follow handout for starting.    tiotropium-olodateroL (STIOLTO RESPIMAT) 2.5-2.5 mcg/actuation Mist Inhale 2 puffs into the lungs Daily. Controller    isosorbide mononitrate (IMDUR) 30 MG 24 hr tablet Take 1 tablet (30 mg total) by mouth every evening.    zinc gluconate 50 mg tablet Take 50 mg by mouth once daily. (Patient not taking: Reported on 7/9/2024)     No current facility-administered medications for this visit.     BP (!) 140/68 (BP Location: Right arm, Patient Position: Sitting, BP Method: Large (Manual))   Pulse 66   Ht 5' 1" (1.549 m)   LMP  (LMP Unknown)   SpO2 (!) 94%   BMI 50.76 kg/m²     Objective:      Physical Exam  Vitals reviewed.   Constitutional:       General: She is not in acute distress.     Appearance: She is obese.   HENT:      Head: Normocephalic and atraumatic.   Eyes:      General: No scleral icterus.     Extraocular Movements: Extraocular movements intact.   Cardiovascular:      Rate and Rhythm: Normal rate and regular rhythm.      Pulses: Normal pulses.      Heart sounds: Normal heart sounds.      No friction rub.   Pulmonary:      Effort: Pulmonary effort is normal. No respiratory distress.      Breath sounds: Normal breath sounds.   Abdominal:      General: Abdomen is flat.      Palpations: Abdomen is soft.   Musculoskeletal:         General: No swelling.      Cervical back: Normal range of motion. No tenderness.      Right lower leg: No edema.      Left lower leg: No edema.   Neurological:      General: No focal deficit present.      Mental Status: She is oriented to " person, place, and time.      Motor: No weakness.   Psychiatric:         Mood and Affect: Mood normal.         Behavior: Behavior normal.         Assessment:     Lab Results   Component Value Date    CREATININE 2.26 (H) 07/02/2024    CREATININE 2.26 (H) 07/02/2024    BUN 27 (H) 07/02/2024    BUN 27 (H) 07/02/2024     07/02/2024     07/02/2024    K 4.9 07/02/2024    K 4.9 07/02/2024     07/02/2024     07/02/2024    CO2 27 07/02/2024    CO2 27 07/02/2024     Lab Results   Component Value Date    .3 (H) 07/02/2024    CALCIUM 9.5 07/02/2024    CALCIUM 9.5 07/02/2024    PHOS 3.2 07/02/2024     Lab Results   Component Value Date    HCT 38.6 07/02/2024     Prot/Creat Ratio, Urine   Date Value Ref Range Status   07/02/2024 1911.9 (H) 10.0 - 107.0 mg/g Final   04/08/2023 4466.5 (H) 10.0 - 107.0 mg/g Final       Lab Results   Component Value Date    MICALBCREAT 767.2 (H) 07/02/2024    MICALBCREAT Unable to calculate 10/12/2023    MICALBCREAT Unable to calculate 04/08/2023    MICALBCREAT 8.7 11/03/2011           1. CKD (chronic kidney disease) stage 4, GFR 15-29 ml/min    2. Type 2 diabetes mellitus with diabetic nephropathy, with long-term current use of insulin    3. Primary hypertension    4. OLIVIER on CPAP    5. Morbid obesity    6. Rheumatoid arthritis with positive rheumatoid factor, involving unspecified site    7. Secondary hyperparathyroidism of renal origin          Plan:   Return to clinic in 3 months.  Labs for next visit include rfp, uacr, upcr, pth, cbc  Baseline creatinine is 2.5-3.0mg/dL.    CKD stage IV / A3  Risk: known biopsy proven hypertensive nephrosclerosis and diabetic nephropathy  Renal fxn at baseline anselmo today  Continue on SGLT2-I for CKD-MACE risk reduction, proteinuria reduction and compa-protection  Also on GLP1a which has demonstrated renal protective effects (FLOW TRIAL)  Not tolerating RASI d/t recurrent hyperkalemia  Renal fxn too advanced for Non-steroidal  Mineralocorticoid Receptor Antagonist   Again send for options education today - kidney smart  Encourage fluid intake to avoid dehydration.     DM type II - f/u with pcp to ensure tighter glucose control. Hgb A1c 5.8%    Hypertension- We reviewed her home BP logs, which demonstrated control. 116/60 average at home. Continue current medications.    Morbid obesity- Mediterranean diet for health benefits and weight loss.  This diet also is beneficial in improving HTN, DM, protein in urine as well as kidney function.  We discussed aerobic exercise (walking for 30 min) at least 3x weekly as tolerated. On GLP1a as well. Losing weight.     Rheumatoid arthritis - on sulfasalazine    Secondary Hyperparathyroidism - PTH recently elevated; Vit d level sufficient. Monitoring trends. Takes 10K vit D3 everyday d/t absorption      Laith Diaz MD  [facility] [place]

## 2024-10-04 ENCOUNTER — OFFICE VISIT (OUTPATIENT)
Dept: RHEUMATOLOGY | Facility: CLINIC | Age: 69
End: 2024-10-04
Payer: MEDICARE

## 2024-10-04 VITALS — HEIGHT: 61 IN | WEIGHT: 264.56 LBS | BODY MASS INDEX: 49.95 KG/M2

## 2024-10-04 DIAGNOSIS — M05.9 RHEUMATOID ARTHRITIS WITH POSITIVE RHEUMATOID FACTOR, INVOLVING UNSPECIFIED SITE: Primary | ICD-10-CM

## 2024-10-04 DIAGNOSIS — M17.4 OTHER SECONDARY OSTEOARTHRITIS OF BOTH KNEES: ICD-10-CM

## 2024-10-04 DIAGNOSIS — Z79.899 HIGH RISK MEDICATIONS (NOT ANTICOAGULANTS) LONG-TERM USE: ICD-10-CM

## 2024-10-04 PROCEDURE — 3288F FALL RISK ASSESSMENT DOCD: CPT | Mod: CPTII,S$GLB,, | Performed by: INTERNAL MEDICINE

## 2024-10-04 PROCEDURE — 3066F NEPHROPATHY DOC TX: CPT | Mod: CPTII,S$GLB,, | Performed by: INTERNAL MEDICINE

## 2024-10-04 PROCEDURE — 3044F HG A1C LEVEL LT 7.0%: CPT | Mod: CPTII,S$GLB,, | Performed by: INTERNAL MEDICINE

## 2024-10-04 PROCEDURE — 99214 OFFICE O/P EST MOD 30 MIN: CPT | Mod: S$GLB,,, | Performed by: INTERNAL MEDICINE

## 2024-10-04 PROCEDURE — 3062F POS MACROALBUMINURIA REV: CPT | Mod: CPTII,S$GLB,, | Performed by: INTERNAL MEDICINE

## 2024-10-04 PROCEDURE — 1101F PT FALLS ASSESS-DOCD LE1/YR: CPT | Mod: CPTII,S$GLB,, | Performed by: INTERNAL MEDICINE

## 2024-10-04 PROCEDURE — 1125F AMNT PAIN NOTED PAIN PRSNT: CPT | Mod: CPTII,S$GLB,, | Performed by: INTERNAL MEDICINE

## 2024-10-04 PROCEDURE — 99999 PR PBB SHADOW E&M-EST. PATIENT-LVL IV: CPT | Mod: PBBFAC,,, | Performed by: INTERNAL MEDICINE

## 2024-10-04 PROCEDURE — 3008F BODY MASS INDEX DOCD: CPT | Mod: CPTII,S$GLB,, | Performed by: INTERNAL MEDICINE

## 2024-10-04 RX ORDER — DULOXETIN HYDROCHLORIDE 20 MG/1
20 CAPSULE, DELAYED RELEASE ORAL DAILY
Qty: 30 CAPSULE | Refills: 5 | Status: SHIPPED | OUTPATIENT
Start: 2024-10-04 | End: 2025-10-04

## 2024-10-04 ASSESSMENT — ROUTINE ASSESSMENT OF PATIENT INDEX DATA (RAPID3)
MDHAQ FUNCTION SCORE: 0.8
PSYCHOLOGICAL DISTRESS SCORE: 0
TOTAL RAPID3 SCORE: 5.22
PATIENT GLOBAL ASSESSMENT SCORE: 6
FATIGUE SCORE: 1.1
PAIN SCORE: 7

## 2024-10-15 ENCOUNTER — OFFICE VISIT (OUTPATIENT)
Dept: NEPHROLOGY | Facility: CLINIC | Age: 69
End: 2024-10-15
Payer: MEDICARE

## 2024-10-15 VITALS — HEART RATE: 56 BPM | OXYGEN SATURATION: 95 % | DIASTOLIC BLOOD PRESSURE: 70 MMHG | SYSTOLIC BLOOD PRESSURE: 144 MMHG

## 2024-10-15 DIAGNOSIS — E66.01 MORBID OBESITY: ICD-10-CM

## 2024-10-15 DIAGNOSIS — Z79.4 TYPE 2 DIABETES MELLITUS WITH DIABETIC NEPHROPATHY, WITH LONG-TERM CURRENT USE OF INSULIN: ICD-10-CM

## 2024-10-15 DIAGNOSIS — I10 PRIMARY HYPERTENSION: ICD-10-CM

## 2024-10-15 DIAGNOSIS — N25.81 SECONDARY HYPERPARATHYROIDISM OF RENAL ORIGIN: ICD-10-CM

## 2024-10-15 DIAGNOSIS — M05.9 RHEUMATOID ARTHRITIS WITH POSITIVE RHEUMATOID FACTOR, INVOLVING UNSPECIFIED SITE: ICD-10-CM

## 2024-10-15 DIAGNOSIS — N18.4 CKD (CHRONIC KIDNEY DISEASE) STAGE 4, GFR 15-29 ML/MIN: Primary | ICD-10-CM

## 2024-10-15 DIAGNOSIS — G47.33 OSA ON CPAP: ICD-10-CM

## 2024-10-15 DIAGNOSIS — E11.21 TYPE 2 DIABETES MELLITUS WITH DIABETIC NEPHROPATHY, WITH LONG-TERM CURRENT USE OF INSULIN: ICD-10-CM

## 2024-10-15 PROCEDURE — 3044F HG A1C LEVEL LT 7.0%: CPT | Mod: CPTII,S$GLB,, | Performed by: STUDENT IN AN ORGANIZED HEALTH CARE EDUCATION/TRAINING PROGRAM

## 2024-10-15 PROCEDURE — 1160F RVW MEDS BY RX/DR IN RCRD: CPT | Mod: CPTII,S$GLB,, | Performed by: STUDENT IN AN ORGANIZED HEALTH CARE EDUCATION/TRAINING PROGRAM

## 2024-10-15 PROCEDURE — 3078F DIAST BP <80 MM HG: CPT | Mod: CPTII,S$GLB,, | Performed by: STUDENT IN AN ORGANIZED HEALTH CARE EDUCATION/TRAINING PROGRAM

## 2024-10-15 PROCEDURE — 3077F SYST BP >= 140 MM HG: CPT | Mod: CPTII,S$GLB,, | Performed by: STUDENT IN AN ORGANIZED HEALTH CARE EDUCATION/TRAINING PROGRAM

## 2024-10-15 PROCEDURE — 1159F MED LIST DOCD IN RCRD: CPT | Mod: CPTII,S$GLB,, | Performed by: STUDENT IN AN ORGANIZED HEALTH CARE EDUCATION/TRAINING PROGRAM

## 2024-10-15 PROCEDURE — 3066F NEPHROPATHY DOC TX: CPT | Mod: CPTII,S$GLB,, | Performed by: STUDENT IN AN ORGANIZED HEALTH CARE EDUCATION/TRAINING PROGRAM

## 2024-10-15 PROCEDURE — 99214 OFFICE O/P EST MOD 30 MIN: CPT | Mod: S$GLB,,, | Performed by: STUDENT IN AN ORGANIZED HEALTH CARE EDUCATION/TRAINING PROGRAM

## 2024-10-15 PROCEDURE — 99999 PR PBB SHADOW E&M-EST. PATIENT-LVL III: CPT | Mod: PBBFAC,,, | Performed by: STUDENT IN AN ORGANIZED HEALTH CARE EDUCATION/TRAINING PROGRAM

## 2024-10-15 PROCEDURE — 3062F POS MACROALBUMINURIA REV: CPT | Mod: CPTII,S$GLB,, | Performed by: STUDENT IN AN ORGANIZED HEALTH CARE EDUCATION/TRAINING PROGRAM

## 2024-10-15 RX ORDER — CINACALCET 30 MG/1
30 TABLET, FILM COATED ORAL
Qty: 36 TABLET | Refills: 3 | Status: SHIPPED | OUTPATIENT
Start: 2024-10-16 | End: 2025-10-16

## 2024-10-15 RX ORDER — BUDESONIDE, GLYCOPYRROLATE, AND FORMOTEROL FUMARATE 160; 9; 4.8 UG/1; UG/1; UG/1
AEROSOL, METERED RESPIRATORY (INHALATION)
COMMUNITY
Start: 2024-10-09

## 2024-10-15 NOTE — PROGRESS NOTES
Subjective:       Patient ID: Otto Al is a 69 y.o.  female who presents for new patient evaluation for chronic renal failure. She was previously seen by me in another clinic setting.  She has a pertinent medical history of CHF, morbid obesity, hypertension with prior admissions for hypertensive emergency, rheumatoid arthritis, COPD with home oxygen use at baseline, DM type 2, and chronic kidney disease stage IIIB.  She underwent renal biopsy in April of 2023 which was consistent with severe hypertensive and diabetic nephropathy, 50% glomerular sclerosis 50% interstitial fibrosis and 40% tubular atrophy.     She has no uremic or urinary symptoms and is in her usual state of health.      She was recently discharged from [facility] for LEANNE on CKD d/t CRS. It appears she also had background progression of her CKD as well from CKDIIIb to CKDIV over the last 6 months. We will send her to the CKD education team today for CKD basics and CKD-modalities education.     Interval history:  7/9//24 clinic visit- patient here for ongoing evaluation and management of CKD. She is doing well today and denies acute medical complaints. She has no uremic or urinary symptoms and is in her usual state of health. Losing weight with semaglutide and feeling well.  We reviewed her lab trends at chairside. She is making better improvements into controlling her DM. sCr has improved to 2.2mg/dL. Proteinuria is trending down.     10/15/24 - doing well today. Here for ongoing evaluation and management for CKD. Denies acute medical complaints. Watching her diet with better glucose control. Water intake of 1500ml-2000ml per day. Labs reviewed with patient at chairside today. Renal fxn based on sCr trend remains stable. She reports she is losing weight.       Review of Systems   Constitutional:  Negative for chills, diaphoresis and fever.   Respiratory:  Negative for cough and shortness of breath.    Cardiovascular:  Negative for chest pain and leg  swelling.   Gastrointestinal:  Negative for abdominal pain, diarrhea, nausea and vomiting.   Genitourinary:  Negative for difficulty urinating, dysuria and hematuria.   Musculoskeletal:  Negative for myalgias.   Neurological:  Negative for headaches.   Hematological:  Does not bruise/bleed easily.   All other systems reviewed and are negative.      The past medical, family and social histories were reviewed for this encounter.     Past Medical History:   Diagnosis Date    Biot's respiration 2016    CHF (congestive heart failure)     Diabetes mellitus     Diastolic heart failure     Hypertension     Obesity hypoventilation syndrome     on home O2 at 2L    Obstructive sleep apnea     Renal disorder 2023    Stage IV    Thyroid disease        Current Outpatient Medications   Medication Sig    acetaminophen (TYLENOL) 325 MG tablet Take 2 tablets (650 mg total) by mouth every 4 (four) hours as needed.    albuterol sulfate 2.5 mg/0.5 mL Nebu Take 2.5 mg by nebulization every 4 (four) hours as needed (wheezing/shortness of breathe). Rescue    albuterol-ipratropium (DUO-NEB) 2.5 mg-0.5 mg/3 mL nebulizer solution Take 3 mLs by nebulization every 6 (six) hours as needed for Wheezing. Rescue    allopurinoL (ZYLOPRIM) 100 MG tablet Take 1 tablet (100 mg total) by mouth once daily.    aspirin (ECOTRIN) 81 MG EC tablet Take 81 mg by mouth once daily.    BREZTRI AEROSPHERE 160-9-4.8 mcg/actuation HFAA Inhale into the lungs.    cholecalciferol, vitamin D3, (VITAMIN D3) 5,000 unit Tab Take 10,000 Units by mouth once daily. Indications: low vitamin D levels    cyanocobalamin, vitamin B-12, 2,500 mcg Tab Take 5,000 mcg by mouth once daily.    DULoxetine (CYMBALTA) 20 MG capsule Take 1 capsule (20 mg total) by mouth once daily.    furosemide (LASIX) 40 MG tablet Take 1 tablet (40 mg total) by mouth once daily.    hydrALAZINE (APRESOLINE) 100 MG tablet Take 1 tablet (100 mg total) by mouth every 8 (eight) hours.    isosorbide mononitrate  (IMDUR) 30 MG 24 hr tablet Take 1 tablet (30 mg total) by mouth every evening.    JARDIANCE 10 mg tablet Take 1 tablet (10 mg total) by mouth once daily.    levocetirizine (XYZAL) 5 MG tablet Take 5 mg by mouth every evening.    levothyroxine (SYNTHROID, LEVOTHROID) 175 MCG tablet Take 1 tablet (175 mcg total) by mouth before breakfast.    linaGLIPtin (TRADJENTA) 5 mg Tab tablet Take 5 mg by mouth once daily.    liothyronine (CYTOMEL) 25 MCG Tab Take 25 mcg by mouth Daily. Indications: a condition with low thyroid hormone levels    multivitamin capsule Take 1 capsule by mouth once daily.    nebivoloL (BYSTOLIC) 10 MG Tab Take 10 mg by mouth once daily.    OXYGEN-AIR DELIVERY SYSTEMS MISC 3 L/min by Nasal route continuous. 2-4 lpm  Indications: air hunger prevention     rosuvastatin (CRESTOR) 40 MG Tab Take 40 mg by mouth once daily.    semaglutide (OZEMPIC) 1 mg/dose (4 mg/3 mL) Inject 1 mg into the skin every Sunday.    sulfaSALAzine (AZULFIDINE) 500 mg Tab 1,000mg in AM and 500mg at night. Follow handout for starting.    tiotropium-olodateroL (STIOLTO RESPIMAT) 2.5-2.5 mcg/actuation Mist Inhale 2 puffs into the lungs Daily. Controller    zinc gluconate 50 mg tablet Take 50 mg by mouth once daily. (Patient not taking: Reported on 7/9/2024)     No current facility-administered medications for this visit.     BP (!) 144/70   Pulse (!) 56   LMP  (LMP Unknown)   SpO2 95%     Objective:      Physical Exam  Vitals reviewed.   Constitutional:       General: She is not in acute distress.     Appearance: She is obese.   Cardiovascular:      Rate and Rhythm: Normal rate and regular rhythm.      Pulses: Normal pulses.      Heart sounds: Normal heart sounds.      No friction rub.   Pulmonary:      Effort: Pulmonary effort is normal. No respiratory distress.      Breath sounds: Normal breath sounds.   Abdominal:      General: Abdomen is protuberant.      Palpations: Abdomen is soft.   Musculoskeletal:         General: No  swelling.      Cervical back: Normal range of motion. No tenderness.      Right lower leg: No edema.      Left lower leg: No edema.   Neurological:      General: No focal deficit present.      Mental Status: She is oriented to person, place, and time.      Motor: No weakness.   Psychiatric:         Mood and Affect: Mood normal.         Behavior: Behavior normal.         Assessment:     Lab Results   Component Value Date    CREATININE 2.20 (H) 09/27/2024    BUN 35 (H) 09/27/2024     09/27/2024    K 4.5 09/27/2024     09/27/2024    CO2 26 09/27/2024     Lab Results   Component Value Date    .7 (H) 09/27/2024    CALCIUM 9.9 09/27/2024    PHOS 4.9 (H) 09/27/2024     Lab Results   Component Value Date    HCT 42.4 09/27/2024    HCT 42.4 09/27/2024     Prot/Creat Ratio, Urine   Date Value Ref Range Status   07/02/2024 1911.9 (H) 10.0 - 107.0 mg/g Final   04/08/2023 4466.5 (H) 10.0 - 107.0 mg/g Final       Lab Results   Component Value Date    MICALBCREAT 767.2 (H) 07/02/2024    MICALBCREAT Unable to calculate 10/12/2023    MICALBCREAT Unable to calculate 04/08/2023    MICALBCREAT 8.7 11/03/2011           1. CKD (chronic kidney disease) stage 4, GFR 15-29 ml/min    2. Type 2 diabetes mellitus with diabetic nephropathy, with long-term current use of insulin    3. Primary hypertension    4. OLIVIER on CPAP    5. Morbid obesity    6. Rheumatoid arthritis with positive rheumatoid factor, involving unspecified site    7. Secondary hyperparathyroidism of renal origin            Plan:   Return to clinic in 3 months.  Labs for next visit include rfp, uacr, upcr, pth, cbc  Baseline creatinine is 2.5-3.0mg/dL.    CKD stage IV / A3  Risk: known biopsy proven hypertensive nephrosclerosis and diabetic nephropathy  Renal fxn at baseline anselmo today  Continue on SGLT2-I for CKD-MACE risk reduction, proteinuria reduction and compa-protection  Also on GLP1a which has demonstrated renal protective effects (FLOW TRIAL)  Not  tolerating RASI d/t recurrent hyperkalemia; not interested in taking enteric K binder  Renal fxn too advanced for Non-steroidal Mineralocorticoid Receptor Antagonist   Completed kidney education class  Encourage fluid intake to avoid dehydration.     DM type II - f/u with pcp to ensure tighter glucose control. Reviewed HgbA1c trend    Hypertension- We reviewed her home BP logs, which demonstrated control. 116-120s systolic at home. Continue current medications.    Morbid obesity- Mediterranean diet for health benefits and weight loss.  This diet also is beneficial in improving HTN, DM, protein in urine as well as kidney function.  We discussed aerobic exercise (walking for 30 min) at least 3x weekly as tolerated. On GLP1a as well. Losing weight.     Rheumatoid arthritis - on Sulfasalazine    Secondary Hyperparathyroidism - PTH recently elevated; Vit d level sufficient. Monitoring trends. Takes 10K vit D3 everyday d/t absorption. Will start sensipar 30 MWF today.       Laith Diaz MD  [facility]      KFRE 2-Year: 16.8% at 9/27/2024  9:27 AM  Calculated from:  Serum Creatinine: 2.2 mg/dL at 9/27/2024  9:27 AM  Urine Albumin Creatinine Ratio: 767.2 ug/mg at 7/2/2024  9:47 AM  Age: 69 years  Sex: Female at 9/27/2024  9:27 AM  Has CKD-3 to CKD-5: Yes    KF 5-Year: 43.8% at 9/27/2024  9:27 AM  Calculated from:  Serum Creatinine: 2.2 mg/dL at 9/27/2024  9:27 AM  Urine Albumin Creatinine Ratio: 767.2 ug/mg at 7/2/2024  9:47 AM  Age: 69 years  Sex: Female at 9/27/2024  9:27 AM  Has CKD-3 to CKD-5: Yes

## 2025-01-31 ENCOUNTER — OFFICE VISIT (OUTPATIENT)
Dept: NEPHROLOGY | Facility: CLINIC | Age: 70
End: 2025-01-31
Payer: MEDICARE

## 2025-01-31 VITALS
DIASTOLIC BLOOD PRESSURE: 72 MMHG | BODY MASS INDEX: 49.99 KG/M2 | OXYGEN SATURATION: 99 % | HEIGHT: 61 IN | SYSTOLIC BLOOD PRESSURE: 162 MMHG | HEART RATE: 49 BPM

## 2025-01-31 DIAGNOSIS — Z79.4 TYPE 2 DIABETES MELLITUS WITH DIABETIC NEPHROPATHY, WITH LONG-TERM CURRENT USE OF INSULIN: ICD-10-CM

## 2025-01-31 DIAGNOSIS — N25.81 SECONDARY HYPERPARATHYROIDISM OF RENAL ORIGIN: ICD-10-CM

## 2025-01-31 DIAGNOSIS — I10 PRIMARY HYPERTENSION: ICD-10-CM

## 2025-01-31 DIAGNOSIS — E11.21 TYPE 2 DIABETES MELLITUS WITH DIABETIC NEPHROPATHY, WITH LONG-TERM CURRENT USE OF INSULIN: ICD-10-CM

## 2025-01-31 DIAGNOSIS — N18.4 CKD (CHRONIC KIDNEY DISEASE) STAGE 4, GFR 15-29 ML/MIN: Primary | ICD-10-CM

## 2025-01-31 DIAGNOSIS — E66.01 MORBID OBESITY: ICD-10-CM

## 2025-01-31 DIAGNOSIS — G47.33 OSA ON CPAP: ICD-10-CM

## 2025-01-31 DIAGNOSIS — M05.9 RHEUMATOID ARTHRITIS WITH POSITIVE RHEUMATOID FACTOR, INVOLVING UNSPECIFIED SITE: ICD-10-CM

## 2025-01-31 PROCEDURE — 1160F RVW MEDS BY RX/DR IN RCRD: CPT | Mod: CPTII,S$GLB,, | Performed by: STUDENT IN AN ORGANIZED HEALTH CARE EDUCATION/TRAINING PROGRAM

## 2025-01-31 PROCEDURE — 99999 PR PBB SHADOW E&M-EST. PATIENT-LVL IV: CPT | Mod: PBBFAC,,, | Performed by: STUDENT IN AN ORGANIZED HEALTH CARE EDUCATION/TRAINING PROGRAM

## 2025-01-31 PROCEDURE — 1159F MED LIST DOCD IN RCRD: CPT | Mod: CPTII,S$GLB,, | Performed by: STUDENT IN AN ORGANIZED HEALTH CARE EDUCATION/TRAINING PROGRAM

## 2025-01-31 PROCEDURE — 3066F NEPHROPATHY DOC TX: CPT | Mod: CPTII,S$GLB,, | Performed by: STUDENT IN AN ORGANIZED HEALTH CARE EDUCATION/TRAINING PROGRAM

## 2025-01-31 PROCEDURE — 3077F SYST BP >= 140 MM HG: CPT | Mod: CPTII,S$GLB,, | Performed by: STUDENT IN AN ORGANIZED HEALTH CARE EDUCATION/TRAINING PROGRAM

## 2025-01-31 PROCEDURE — 3078F DIAST BP <80 MM HG: CPT | Mod: CPTII,S$GLB,, | Performed by: STUDENT IN AN ORGANIZED HEALTH CARE EDUCATION/TRAINING PROGRAM

## 2025-01-31 PROCEDURE — 3008F BODY MASS INDEX DOCD: CPT | Mod: CPTII,S$GLB,, | Performed by: STUDENT IN AN ORGANIZED HEALTH CARE EDUCATION/TRAINING PROGRAM

## 2025-01-31 PROCEDURE — 99214 OFFICE O/P EST MOD 30 MIN: CPT | Mod: S$GLB,,, | Performed by: STUDENT IN AN ORGANIZED HEALTH CARE EDUCATION/TRAINING PROGRAM

## 2025-01-31 RX ORDER — HYDRALAZINE HYDROCHLORIDE 100 MG/1
100 TABLET, FILM COATED ORAL EVERY 8 HOURS
Qty: 270 TABLET | Refills: 3 | Status: SHIPPED | OUTPATIENT
Start: 2025-01-31 | End: 2026-01-31

## 2025-01-31 RX ORDER — ISOSORBIDE MONONITRATE 30 MG/1
30 TABLET, EXTENDED RELEASE ORAL NIGHTLY
Qty: 90 TABLET | Refills: 3 | Status: SHIPPED | OUTPATIENT
Start: 2025-01-31 | End: 2026-01-26

## 2025-01-31 NOTE — PROGRESS NOTES
[facility]  Nephrology Clinic    Subjective:       HPI ID: Otto Al is a 69 y.o.  female who returns for ongoing evaluation and management of CKD.   She was previously seen by me in another clinic setting.  She has a pertinent medical history of CHF, morbid obesity, hypertension with prior admissions for hypertensive emergency, rheumatoid arthritis, COPD with home oxygen use at baseline, DM type 2, and chronic kidney disease stage IIIB.  She underwent renal biopsy in April of 2023 which was consistent with severe hypertensive and diabetic nephropathy, 50% glomerular sclerosis 50% interstitial fibrosis and 40% tubular atrophy.     She has no uremic or urinary symptoms and is in her usual state of health.      She was recently discharged from [facility] for LEANNE on CKD d/t CRS. It appears she also had background progression of her CKD as well from CKDIIIb to CKDIV over the last 6 months. We will send her to the CKD education team today for CKD basics and CKD-modalities education.     Interval history:  7/9//24 clinic visit- patient here for ongoing evaluation and management of CKD. She is doing well today and denies acute medical complaints. She has no uremic or urinary symptoms and is in her usual state of health. Losing weight with semaglutide and feeling well.  We reviewed her lab trends at chairside. She is making better improvements into controlling her DM. sCr has improved to 2.2mg/dL. Proteinuria is trending down.     10/15/24 - doing well today. Here for ongoing evaluation and management for CKD. Denies acute medical complaints. Watching her diet with better glucose control. Water intake of 1500ml-2000ml per day. Labs reviewed with patient at chairside today. Renal fxn based on sCr trend remains stable. She reports she is losing weight.     01/31/25 - here for CKD f/u. Feels well today. Denies acute complaints. She has no uremic or urinary symptoms and is in her usual state of health.   She is still maintaining 1500-2000ml of free water intake per day. We reviewed recent labs at chairside.  Renal function based on serum creatinine trend remains at baseline.  Denies further weight loss since last visit  Good glucose control. PCP is increasing dose of semgalutide this week.   We discussed her upward PTH trend despite sensipar. She is not interested in NM imaging at this time.       Review of Systems   Constitutional:  Negative for chills, diaphoresis and fever.   Respiratory:  Negative for cough and shortness of breath.    Cardiovascular:  Negative for chest pain and leg swelling.   Gastrointestinal:  Negative for abdominal pain, diarrhea, nausea and vomiting.   Genitourinary:  Negative for difficulty urinating, dysuria and hematuria.   Musculoskeletal:  Negative for myalgias.   Neurological:  Negative for headaches.   Hematological:  Does not bruise/bleed easily.   All other systems reviewed and are negative.      The past medical, family and social histories were reviewed for this encounter.     Past Medical History:   Diagnosis Date    Biot's respiration 2016    CHF (congestive heart failure)     Diabetes mellitus     Diastolic heart failure     Hypertension     Obesity hypoventilation syndrome     on home O2 at 2L    Obstructive sleep apnea     Renal disorder 2023    Stage IV    Thyroid disease        Current Outpatient Medications   Medication Sig    albuterol-ipratropium (DUO-NEB) 2.5 mg-0.5 mg/3 mL nebulizer solution Take 3 mLs by nebulization every 6 (six) hours as needed for Wheezing. Rescue    allopurinoL (ZYLOPRIM) 100 MG tablet Take 1 tablet (100 mg total) by mouth once daily.    aspirin (ECOTRIN) 81 MG EC tablet Take 81 mg by mouth once daily.    BREZTRI AEROSPHERE 160-9-4.8 mcg/actuation HFAA Inhale into the lungs.    cholecalciferol, vitamin D3, (VITAMIN D3) 5,000 unit Tab Take 10,000 Units by mouth once daily. Indications: low vitamin D levels    cinacalcet (SENSIPAR) 30 MG Tab Take 1  tablet (30 mg total) by mouth every Mon, Wed, Fri. Take with breakfast    cyanocobalamin, vitamin B-12, 2,500 mcg Tab Take 5,000 mcg by mouth once daily.    DULoxetine (CYMBALTA) 20 MG capsule Take 1 capsule (20 mg total) by mouth once daily.    furosemide (LASIX) 40 MG tablet Take 1 tablet (40 mg total) by mouth once daily.    JARDIANCE 10 mg tablet Take 1 tablet (10 mg total) by mouth once daily.    levocetirizine (XYZAL) 5 MG tablet Take 5 mg by mouth every evening.    levothyroxine (SYNTHROID, LEVOTHROID) 175 MCG tablet Take 1 tablet (175 mcg total) by mouth before breakfast.    linaGLIPtin (TRADJENTA) 5 mg Tab tablet Take 5 mg by mouth once daily.    liothyronine (CYTOMEL) 25 MCG Tab Take 25 mcg by mouth Daily. Indications: a condition with low thyroid hormone levels    multivitamin capsule Take 1 capsule by mouth once daily.    nebivoloL (BYSTOLIC) 10 MG Tab Take 10 mg by mouth once daily.    OXYGEN-AIR DELIVERY SYSTEMS MISC 3 L/min by Nasal route continuous. 2-4 lpm  Indications: air hunger prevention     rosuvastatin (CRESTOR) 40 MG Tab Take 40 mg by mouth once daily.    semaglutide (OZEMPIC) 1 mg/dose (4 mg/3 mL) Inject 1 mg into the skin every Sunday.    sulfaSALAzine (AZULFIDINE) 500 mg Tab 1,000mg in AM and 500mg at night. Follow handout for starting.    zinc gluconate 50 mg tablet Take 50 mg by mouth once daily.    acetaminophen (TYLENOL) 325 MG tablet Take 2 tablets (650 mg total) by mouth every 4 (four) hours as needed. (Patient not taking: Reported on 1/31/2025)    albuterol sulfate 2.5 mg/0.5 mL Nebu Take 2.5 mg by nebulization every 4 (four) hours as needed (wheezing/shortness of breathe). Rescue (Patient not taking: Reported on 1/31/2025)    hydrALAZINE (APRESOLINE) 100 MG tablet Take 1 tablet (100 mg total) by mouth every 8 (eight) hours.    isosorbide mononitrate (IMDUR) 30 MG 24 hr tablet Take 1 tablet (30 mg total) by mouth every evening.    tiotropium-olodateroL (STIOLTO RESPIMAT) 2.5-2.5  "mcg/actuation Mist Inhale 2 puffs into the lungs Daily. Controller (Patient not taking: Reported on 1/31/2025)     No current facility-administered medications for this visit.     BP (!) 162/72 (BP Location: Left forearm, Patient Position: Sitting)   Pulse (!) 49   Ht 5' 1" (1.549 m)   LMP  (LMP Unknown)   SpO2 99%   BMI 49.99 kg/m²     Objective:      Physical Exam  Vitals reviewed.   Constitutional:       General: She is not in acute distress.     Appearance: She is obese.   Cardiovascular:      Pulses: Normal pulses.      Heart sounds: Normal heart sounds.      No friction rub.   Pulmonary:      Effort: Pulmonary effort is normal. No respiratory distress.      Breath sounds: Normal breath sounds.   Abdominal:      General: Abdomen is protuberant.      Palpations: Abdomen is soft.      Tenderness: There is no abdominal tenderness.   Musculoskeletal:         General: No swelling.      Cervical back: Normal range of motion. No tenderness.      Right lower leg: No edema.      Left lower leg: No edema.   Neurological:      General: No focal deficit present.      Mental Status: She is oriented to person, place, and time.      Motor: No weakness.   Psychiatric:         Mood and Affect: Mood normal.         Behavior: Behavior normal.         Assessment:     Lab Results   Component Value Date    CREATININE 2.16 (H) 01/13/2025    CREATININE 2.16 (H) 01/13/2025    CREATININE 2.16 (H) 01/13/2025    BUN 28 (H) 01/13/2025    BUN 28 (H) 01/13/2025     01/13/2025     01/13/2025    K 4.5 01/13/2025    K 4.5 01/13/2025     01/13/2025     01/13/2025    CO2 29 01/13/2025    CO2 29 01/13/2025     Lab Results   Component Value Date    .6 (H) 01/13/2025    CALCIUM 9.3 01/13/2025    CALCIUM 9.3 01/13/2025    PHOS 2.4 (L) 01/13/2025     Lab Results   Component Value Date    HCT 44.6 01/13/2025     Prot/Creat Ratio, Urine   Date Value Ref Range Status   07/02/2024 1911.9 (H) 10.0 - 107.0 mg/g Final "   04/08/2023 4466.5 (H) 10.0 - 107.0 mg/g Final       Lab Results   Component Value Date    MICALBCREAT 767.2 (H) 07/02/2024    MICALBCREAT Unable to calculate 10/12/2023    MICALBCREAT Unable to calculate 04/08/2023    MICALBCREAT 8.7 11/03/2011           1. CKD (chronic kidney disease) stage 4, GFR 15-29 ml/min    2. Type 2 diabetes mellitus with diabetic nephropathy, with long-term current use of insulin    3. Primary hypertension    4. Rheumatoid arthritis with positive rheumatoid factor, involving unspecified site    5. Morbid obesity    6. OLIVIER on CPAP    7. Secondary hyperparathyroidism of renal origin        Plan:   Return to clinic in 4 months.  Labs for next visit include rfp, uacr, upcr, pth,  Baseline creatinine is 2.2-2.5mg/dL.    CKD stage IV / A3  Risk: known biopsy proven hypertensive nephrosclerosis and diabetic nephropathy  Renal fxn at baseline anselmo today  Continue on SGLT2-I for CKD-MACE risk reduction, proteinuria reduction and compa-protection  Also on GLP1a which has demonstrated renal protective effects (FLOW TRIAL)  Does not tolerated RASI d/t recurrent hyperkalemia; not interested in taking enteric K binder  Renal fxn too advanced for Non-steroidal Mineralocorticoid Receptor Antagonist   Completed kidney education class  Encourage fluid intake to avoid dehydration.     DM type II - f/u with pcp to ensure tighter glucose control. Reviewed HgbA1c trend    Hypertension- We reviewed her home BP logs, which demonstrated control. She ran out of her hydralazine and imdur prior to this visit. Refills sent in today.    Morbid obesity- Mediterranean diet for health benefits and weight loss.  This diet also is beneficial in improving HTN, DM, protein in urine as well as kidney function.  We discussed aerobic exercise (walking for 30 min) at least 3x weekly as tolerated. On GLP1a as well with titration per PCP    Rheumatoid arthritis - on Sulfasalazine    Secondary Hyperparathyroidism - PTH recently  elevated; Vit d level sufficient. Monitoring trends. Takes 10K vit D3 everyday d/t absorption. Continue sensipar 30 MWF today.     Hyperuricemia w/ h/o GOUT  -on daily allopurinol. Followed by Rheumatology  -last flare >6 months ago.      __________________________  Laith Diaz MD  [facility]    Part of this note has been created using [employer] dictation system. Errors in transcription may not be completely avoided.      KFRE 2-Year: 15.2% at 1/13/2025  8:38 AM  Calculated from:  Serum Creatinine: 2.16 mg/dL at 1/13/2025  8:38 AM  Urine Albumin Creatinine Ratio: 767.2 ug/mg at 7/2/2024  9:47 AM  Age: 69 years  Sex: Female at 1/13/2025  8:38 AM  Has CKD-3 to CKD-5: Yes    KFRE 5-Year: 40.2% at 1/13/2025  8:38 AM  Calculated from:  Serum Creatinine: 2.16 mg/dL at 1/13/2025  8:38 AM  Urine Albumin Creatinine Ratio: 767.2 ug/mg at 7/2/2024  9:47 AM  Age: 69 years  Sex: Female at 1/13/2025  8:38 AM  Has CKD-3 to CKD-5: Yes

## 2025-03-24 ENCOUNTER — OFFICE VISIT (OUTPATIENT)
Dept: RHEUMATOLOGY | Facility: CLINIC | Age: 70
End: 2025-03-24
Payer: MEDICARE

## 2025-03-24 VITALS
BODY MASS INDEX: 52.7 KG/M2 | HEIGHT: 61 IN | HEART RATE: 61 BPM | WEIGHT: 279.13 LBS | DIASTOLIC BLOOD PRESSURE: 66 MMHG | SYSTOLIC BLOOD PRESSURE: 215 MMHG

## 2025-03-24 DIAGNOSIS — Z79.899 HIGH RISK MEDICATIONS (NOT ANTICOAGULANTS) LONG-TERM USE: ICD-10-CM

## 2025-03-24 DIAGNOSIS — M05.79 RHEUMATOID ARTHRITIS INVOLVING MULTIPLE SITES WITH POSITIVE RHEUMATOID FACTOR: ICD-10-CM

## 2025-03-24 DIAGNOSIS — M17.4 OTHER SECONDARY OSTEOARTHRITIS OF BOTH KNEES: ICD-10-CM

## 2025-03-24 DIAGNOSIS — M05.9 RHEUMATOID ARTHRITIS WITH POSITIVE RHEUMATOID FACTOR, INVOLVING UNSPECIFIED SITE: Primary | ICD-10-CM

## 2025-03-24 DIAGNOSIS — M15.0 PRIMARY OSTEOARTHRITIS INVOLVING MULTIPLE JOINTS: ICD-10-CM

## 2025-03-24 PROCEDURE — 99214 OFFICE O/P EST MOD 30 MIN: CPT | Mod: S$GLB,,, | Performed by: INTERNAL MEDICINE

## 2025-03-24 PROCEDURE — 3008F BODY MASS INDEX DOCD: CPT | Mod: CPTII,S$GLB,, | Performed by: INTERNAL MEDICINE

## 2025-03-24 PROCEDURE — 99999 PR PBB SHADOW E&M-EST. PATIENT-LVL IV: CPT | Mod: PBBFAC,,, | Performed by: INTERNAL MEDICINE

## 2025-03-24 PROCEDURE — 1125F AMNT PAIN NOTED PAIN PRSNT: CPT | Mod: CPTII,S$GLB,, | Performed by: INTERNAL MEDICINE

## 2025-03-24 PROCEDURE — 3078F DIAST BP <80 MM HG: CPT | Mod: CPTII,S$GLB,, | Performed by: INTERNAL MEDICINE

## 2025-03-24 PROCEDURE — 3288F FALL RISK ASSESSMENT DOCD: CPT | Mod: CPTII,S$GLB,, | Performed by: INTERNAL MEDICINE

## 2025-03-24 PROCEDURE — 1159F MED LIST DOCD IN RCRD: CPT | Mod: CPTII,S$GLB,, | Performed by: INTERNAL MEDICINE

## 2025-03-24 PROCEDURE — 1101F PT FALLS ASSESS-DOCD LE1/YR: CPT | Mod: CPTII,S$GLB,, | Performed by: INTERNAL MEDICINE

## 2025-03-24 PROCEDURE — 3077F SYST BP >= 140 MM HG: CPT | Mod: CPTII,S$GLB,, | Performed by: INTERNAL MEDICINE

## 2025-03-24 PROCEDURE — 3066F NEPHROPATHY DOC TX: CPT | Mod: CPTII,S$GLB,, | Performed by: INTERNAL MEDICINE

## 2025-03-24 RX ORDER — DULOXETIN HYDROCHLORIDE 20 MG/1
20 CAPSULE, DELAYED RELEASE ORAL DAILY
Qty: 30 CAPSULE | Refills: 5 | Status: SHIPPED | OUTPATIENT
Start: 2025-03-24 | End: 2026-03-24

## 2025-03-24 RX ORDER — SULFASALAZINE 500 MG/1
TABLET ORAL
Qty: 90 TABLET | Refills: 3 | Status: SHIPPED | OUTPATIENT
Start: 2025-03-24

## 2025-03-24 ASSESSMENT — ROUTINE ASSESSMENT OF PATIENT INDEX DATA (RAPID3)
PAIN SCORE: 7
FATIGUE SCORE: 1.1
PSYCHOLOGICAL DISTRESS SCORE: 0
MDHAQ FUNCTION SCORE: 0.8
PATIENT GLOBAL ASSESSMENT SCORE: 6.5
TOTAL RAPID3 SCORE: 5.39

## 2025-03-24 NOTE — PROGRESS NOTES
"Subjective:          Chief Complaint: Otto Al is a 69 y.o. female who had concerns including Disease Management.    HPI:  Interval     3/2025: overall she is doing well. Knees continue to be her largest issue but highly needle phobic we have discussed.   Continue with Duloxetine and SSZ doing well. No AM stiffness or swelling in hands.     10/2024: sed rate is remarkably better. She continues with left knee pain. She denies any shoulder, wrist, hand, ankle or foot pain.     1/2024: Patient is doing well with addition of SSZ her ESR and CRP are trending downward no swelling in hands and wrist.     10/2023: started HCQ 8 months ago  Today, 10/2023, patient states she is not appreciating much benefit form HCQ, but tolerating.   Pain is 7/10     She is having a more diffuse pattern of arthritis shoulder but states "down the arm" some numbness across all 5 finger tips. She has motion about the shoulder, some tenderness between shoulder and elbow. Numbness does have some cubital distribution but is not positional. MCP, PIP and DIP joints not the painful.     Knees painful with standing, and full extension (pop fossa). She notes some days she has instability. Ambulated with cane.       2/2023: patient with high titer +RF with +SSA and no real sicca complaints. Most consistent with RA   Overall legs (knees, ankles, feet) are the worst. Knees with advanced OA.     Rheum Hx:   By her medical history she has congestive heart failure diabetes mellitus, obesity hypoventilation syndrome, and I do see that she is on allopurinol.   Patient was seen with Ms. Salazar (previously with Dr. Luke office) did have some labs and was referred to Rheumatology. To patients knowledge she was told she had:  Rheumatoid arthritis.   Patient has no prior diagnosis of SLE or RA .   No seizures, strokes, DVT, nephritis, renal stones, photosensitivity, rashes (malar, psoriasis, sclerodactyly), autoimmune mediated anemia. No alopecia. No dry " eyes or dry mouth that has been an issue. Hx of Lumbar arthritis treated once with intervention.   Manages her joints mostly with Tylenol or juaquin ASA (tries only few times weekly as to not harm her kidneys).   No fmHx of SLE or RA.       Component      Latest Ref Rng & Units 1/9/2023   Rheumatoid Factor      0.0 - 15.0 IU/mL 164.6 (H)   CCP Antibodies      <5.0 U/mL <0.5   Sed Rate      0 - 29 mm/Hr 67 (H)   CRP      0.00 - 0.90 mg/dL 0.70   Uric Acid      2.4 - 5.7 mg/dL 9.0 (H)         REVIEW OF SYSTEMS:    Review of Systems   Constitutional:  Negative for fever, malaise/fatigue and weight loss.   HENT:  Negative for sore throat.    Eyes:  Negative for double vision, photophobia and redness.   Respiratory:  Negative for cough, shortness of breath and wheezing.    Cardiovascular:  Negative for chest pain, palpitations and orthopnea.   Gastrointestinal:  Negative for abdominal pain, constipation and diarrhea.   Genitourinary:  Negative for dysuria, hematuria and urgency.   Musculoskeletal:  Positive for joint pain (knees) and myalgias. Negative for back pain.   Skin:  Negative for rash.   Neurological:  Negative for dizziness, tingling, focal weakness and headaches.   Endo/Heme/Allergies:  Does not bruise/bleed easily.   Psychiatric/Behavioral:  Negative for depression, hallucinations and suicidal ideas.                Objective:            Past Medical History:   Diagnosis Date    Biot's respiration 2016    CHF (congestive heart failure)     Diabetes mellitus     Diastolic heart failure     Hypertension     Obesity hypoventilation syndrome     on home O2 at 2L    Obstructive sleep apnea     Renal disorder 2023    Stage IV    Thyroid disease      Family History   Problem Relation Name Age of Onset    Kidney disease Mother      Heart disease Mother      Heart disease Father      Breast cancer Sister      Breast cancer Maternal Aunt       Social History     Tobacco Use    Smoking status: Never    Smokeless tobacco:  Never   Substance Use Topics    Alcohol use: Never    Drug use: No         Current Outpatient Medications on File Prior to Visit   Medication Sig Dispense Refill    albuterol-ipratropium (DUO-NEB) 2.5 mg-0.5 mg/3 mL nebulizer solution Take 3 mLs by nebulization every 6 (six) hours as needed for Wheezing. Rescue 75 mL 0    allopurinoL (ZYLOPRIM) 100 MG tablet Take 1 tablet (100 mg total) by mouth once daily. 30 tablet 3    aspirin (ECOTRIN) 81 MG EC tablet Take 81 mg by mouth once daily.      BREZTRI AEROSPHERE 160-9-4.8 mcg/actuation HFAA Inhale into the lungs.      cholecalciferol, vitamin D3, (VITAMIN D3) 5,000 unit Tab Take 10,000 Units by mouth once daily. Indications: low vitamin D levels      cinacalcet (SENSIPAR) 30 MG Tab Take 1 tablet (30 mg total) by mouth every Mon, Wed, Fri. Take with breakfast 36 tablet 3    cyanocobalamin, vitamin B-12, 2,500 mcg Tab Take 5,000 mcg by mouth once daily.      DULoxetine (CYMBALTA) 20 MG capsule Take 1 capsule (20 mg total) by mouth once daily. 30 capsule 5    furosemide (LASIX) 40 MG tablet Take 1 tablet (40 mg total) by mouth once daily. 30 tablet 11    hydrALAZINE (APRESOLINE) 100 MG tablet Take 1 tablet (100 mg total) by mouth every 8 (eight) hours. 270 tablet 3    isosorbide mononitrate (IMDUR) 30 MG 24 hr tablet Take 1 tablet (30 mg total) by mouth every evening. 90 tablet 3    JARDIANCE 10 mg tablet Take 1 tablet (10 mg total) by mouth once daily. 30 tablet 11    levocetirizine (XYZAL) 5 MG tablet Take 5 mg by mouth every evening.      levothyroxine (SYNTHROID, LEVOTHROID) 175 MCG tablet Take 1 tablet (175 mcg total) by mouth before breakfast. 30 tablet 11    linaGLIPtin (TRADJENTA) 5 mg Tab tablet Take 5 mg by mouth once daily.      liothyronine (CYTOMEL) 25 MCG Tab Take 25 mcg by mouth Daily. Indications: a condition with low thyroid hormone levels      multivitamin capsule Take 1 capsule by mouth once daily.      nebivoloL (BYSTOLIC) 10 MG Tab Take 10 mg by  mouth once daily.      OXYGEN-AIR DELIVERY SYSTEMS MISC 3 L/min by Nasal route continuous. 2-4 lpm  Indications: air hunger prevention       rosuvastatin (CRESTOR) 40 MG Tab Take 40 mg by mouth once daily.      semaglutide (OZEMPIC) 1 mg/dose (4 mg/3 mL) Inject 1 mg into the skin every Sunday.      sulfaSALAzine (AZULFIDINE) 500 mg Tab 1,000mg in AM and 500mg at night. Follow handout for starting. 90 tablet 3    zinc gluconate 50 mg tablet Take 50 mg by mouth once daily.      acetaminophen (TYLENOL) 325 MG tablet Take 2 tablets (650 mg total) by mouth every 4 (four) hours as needed. (Patient not taking: Reported on 3/24/2025)      albuterol sulfate 2.5 mg/0.5 mL Nebu Take 2.5 mg by nebulization every 4 (four) hours as needed (wheezing/shortness of breathe). Rescue (Patient not taking: Reported on 3/24/2025)      tiotropium-olodateroL (STIOLTO RESPIMAT) 2.5-2.5 mcg/actuation Mist Inhale 2 puffs into the lungs Daily. Controller (Patient not taking: Reported on 3/24/2025) 4 g 2    [DISCONTINUED] buPROPion (WELLBUTRIN SR) 150 MG TBSR 12 hr tablet Take 150 mg by mouth once daily.       No current facility-administered medications on file prior to visit.       Vitals:    03/24/25 0934   BP: (!) 215/66   Pulse: 61         Physical Exam:    Physical Exam  Constitutional:       Appearance: She is well-developed.   HENT:      Head: Normocephalic and atraumatic.   Eyes:      Pupils: Pupils are equal, round, and reactive to light.   Cardiovascular:      Rate and Rhythm: Normal rate and regular rhythm.      Heart sounds: Normal heart sounds.   Pulmonary:      Effort: Pulmonary effort is normal.      Breath sounds: Normal breath sounds.   Musculoskeletal:      Right shoulder: No swelling or tenderness. Normal range of motion.      Left shoulder: No swelling or tenderness. Normal range of motion.      Right elbow: No swelling. Normal range of motion. No tenderness.      Left elbow: No swelling. Normal range of motion. No  tenderness.      Right wrist: No swelling or tenderness. Normal range of motion.      Left wrist: No swelling or tenderness. Normal range of motion.      Right hand: No swelling or tenderness. Normal range of motion.      Left hand: No swelling or tenderness. Normal range of motion.      Cervical back: Normal range of motion.      Right knee: No swelling. Normal range of motion. No tenderness.      Left knee: No swelling. Normal range of motion. No tenderness.      Right foot: Normal range of motion. No swelling or tenderness.      Left foot: Normal range of motion. No swelling or tenderness.      Comments: +crepitus bilateral knees    Skin:     General: Skin is warm and dry.   Neurological:      Mental Status: She is alert and oriented to person, place, and time.   Psychiatric:         Behavior: Behavior normal.             Assessment:       Encounter Diagnoses   Name Primary?    Rheumatoid arthritis with positive rheumatoid factor, involving unspecified site Yes    High risk medications (not anticoagulants) long-term use     Primary osteoarthritis involving multiple joints              Plan:        Rheumatoid arthritis with positive rheumatoid factor, involving unspecified site  -     ALT (SGPT); Future; Expected date: 03/24/2025  -     AST (SGOT); Future; Expected date: 03/24/2025  -     CBC Auto Differential; Future; Expected date: 03/24/2025  -     C-Reactive Protein; Future; Expected date: 03/24/2025  -     Creatinine, Serum; Future; Expected date: 03/24/2025  -     Sedimentation rate; Future; Expected date: 03/24/2025  -     sulfaSALAzine (AZULFIDINE) 500 mg Tab; 1,000mg in AM and 500mg at night. Follow handout for starting.  Dispense: 90 tablet; Refill: 3  -     DULoxetine (CYMBALTA) 20 MG capsule; Take 1 capsule (20 mg total) by mouth once daily.  Dispense: 30 capsule; Refill: 5    High risk medications (not anticoagulants) long-term use  -     ALT (SGPT); Future; Expected date: 03/24/2025  -     AST  (SGOT); Future; Expected date: 03/24/2025  -     CBC Auto Differential; Future; Expected date: 03/24/2025  -     C-Reactive Protein; Future; Expected date: 03/24/2025  -     Creatinine, Serum; Future; Expected date: 03/24/2025  -     Sedimentation rate; Future; Expected date: 03/24/2025    Primary osteoarthritis involving multiple joints  -     sulfaSALAzine (AZULFIDINE) 500 mg Tab; 1,000mg in AM and 500mg at night. Follow handout for starting.  Dispense: 90 tablet; Refill: 3    Rheumatoid arthritis involving multiple sites with positive rheumatoid factor  -     sulfaSALAzine (AZULFIDINE) 500 mg Tab; 1,000mg in AM and 500mg at night. Follow handout for starting.  Dispense: 90 tablet; Refill: 3    Other secondary osteoarthritis of both knees  -     DULoxetine (CYMBALTA) 20 MG capsule; Take 1 capsule (20 mg total) by mouth once daily.  Dispense: 30 capsule; Refill: 5        Absolutely delightful 68 year old female with sero+ RA   Ok to continue Tylenol for as needed pain  1.  Sulfasalazine 1000mg AM, 500mg PM  2.  I am adding in Cymbalta low dose   3. We did discuss that visco or even geniculate RFA would be a great option for her knees. She will still consider but very needle phobic.       No follow-ups on file.        30min consultation with greater than 50% of that time included Preparing to see the patient (review records, tests), Obtaining and/or reviewing separately obtained historical data, Performing a medically appropriate examination and/or evaluation , Ordering medications, tests, and/or procedures, Referring and communicating with other healthcare professionals , Documenting clinical information in the electronic or other health record and Independently interpreting results  (as warranted) & communicating results to the patient/family/caregiver. All questions answered.   Discussed the risks benefits and side effects of hydroxychloroquine including but not limited to retinal toxicity, rashes, nausea.   Patient is aware of the monitoring requirements of an annual eye exam will have this done following a trial to see if  tolerates the medication.

## 2025-05-20 ENCOUNTER — TELEPHONE (OUTPATIENT)
Dept: NEPHROLOGY | Facility: CLINIC | Age: 70
End: 2025-05-20
Payer: MEDICARE

## 2025-05-21 ENCOUNTER — TELEPHONE (OUTPATIENT)
Dept: NEPHROLOGY | Facility: CLINIC | Age: 70
End: 2025-05-21
Payer: MEDICARE

## 2025-05-21 NOTE — TELEPHONE ENCOUNTER
Attempted to call patient to reschedule appointment. Unable to reach them at this time. Left voicemail.

## 2025-05-28 DIAGNOSIS — M15.0 PRIMARY OSTEOARTHRITIS INVOLVING MULTIPLE JOINTS: ICD-10-CM

## 2025-05-28 DIAGNOSIS — M05.9 RHEUMATOID ARTHRITIS WITH POSITIVE RHEUMATOID FACTOR, INVOLVING UNSPECIFIED SITE: ICD-10-CM

## 2025-05-28 DIAGNOSIS — M05.79 RHEUMATOID ARTHRITIS INVOLVING MULTIPLE SITES WITH POSITIVE RHEUMATOID FACTOR: ICD-10-CM

## 2025-05-28 RX ORDER — SULFASALAZINE 500 MG/1
TABLET ORAL
Qty: 300 TABLET | Refills: 3 | Status: SHIPPED | OUTPATIENT
Start: 2025-05-28

## 2025-06-16 ENCOUNTER — OFFICE VISIT (OUTPATIENT)
Dept: NEPHROLOGY | Facility: CLINIC | Age: 70
End: 2025-06-16
Payer: MEDICARE

## 2025-06-16 VITALS
BODY MASS INDEX: 52.7 KG/M2 | HEART RATE: 69 BPM | OXYGEN SATURATION: 97 % | SYSTOLIC BLOOD PRESSURE: 132 MMHG | WEIGHT: 279.13 LBS | HEIGHT: 61 IN | DIASTOLIC BLOOD PRESSURE: 60 MMHG

## 2025-06-16 DIAGNOSIS — Z79.4 TYPE 2 DIABETES MELLITUS WITH DIABETIC NEPHROPATHY, WITH LONG-TERM CURRENT USE OF INSULIN: ICD-10-CM

## 2025-06-16 DIAGNOSIS — I10 PRIMARY HYPERTENSION: ICD-10-CM

## 2025-06-16 DIAGNOSIS — E11.21 TYPE 2 DIABETES MELLITUS WITH DIABETIC NEPHROPATHY, WITH LONG-TERM CURRENT USE OF INSULIN: ICD-10-CM

## 2025-06-16 DIAGNOSIS — E66.01 MORBID OBESITY: ICD-10-CM

## 2025-06-16 DIAGNOSIS — M05.9 RHEUMATOID ARTHRITIS WITH POSITIVE RHEUMATOID FACTOR, INVOLVING UNSPECIFIED SITE: ICD-10-CM

## 2025-06-16 DIAGNOSIS — N18.4 CKD (CHRONIC KIDNEY DISEASE) STAGE 4, GFR 15-29 ML/MIN: Primary | ICD-10-CM

## 2025-06-16 DIAGNOSIS — N25.81 SECONDARY HYPERPARATHYROIDISM OF RENAL ORIGIN: ICD-10-CM

## 2025-06-16 DIAGNOSIS — G47.33 OSA ON CPAP: ICD-10-CM

## 2025-06-16 PROCEDURE — 3078F DIAST BP <80 MM HG: CPT | Mod: CPTII,S$GLB,, | Performed by: STUDENT IN AN ORGANIZED HEALTH CARE EDUCATION/TRAINING PROGRAM

## 2025-06-16 PROCEDURE — 1159F MED LIST DOCD IN RCRD: CPT | Mod: CPTII,S$GLB,, | Performed by: STUDENT IN AN ORGANIZED HEALTH CARE EDUCATION/TRAINING PROGRAM

## 2025-06-16 PROCEDURE — 1160F RVW MEDS BY RX/DR IN RCRD: CPT | Mod: CPTII,S$GLB,, | Performed by: STUDENT IN AN ORGANIZED HEALTH CARE EDUCATION/TRAINING PROGRAM

## 2025-06-16 PROCEDURE — 3075F SYST BP GE 130 - 139MM HG: CPT | Mod: CPTII,S$GLB,, | Performed by: STUDENT IN AN ORGANIZED HEALTH CARE EDUCATION/TRAINING PROGRAM

## 2025-06-16 PROCEDURE — 3008F BODY MASS INDEX DOCD: CPT | Mod: CPTII,S$GLB,, | Performed by: STUDENT IN AN ORGANIZED HEALTH CARE EDUCATION/TRAINING PROGRAM

## 2025-06-16 PROCEDURE — 99214 OFFICE O/P EST MOD 30 MIN: CPT | Mod: S$GLB,,, | Performed by: STUDENT IN AN ORGANIZED HEALTH CARE EDUCATION/TRAINING PROGRAM

## 2025-06-16 PROCEDURE — 3044F HG A1C LEVEL LT 7.0%: CPT | Mod: CPTII,S$GLB,, | Performed by: STUDENT IN AN ORGANIZED HEALTH CARE EDUCATION/TRAINING PROGRAM

## 2025-06-16 PROCEDURE — 99999 PR PBB SHADOW E&M-EST. PATIENT-LVL IV: CPT | Mod: PBBFAC,,, | Performed by: STUDENT IN AN ORGANIZED HEALTH CARE EDUCATION/TRAINING PROGRAM

## 2025-06-16 PROCEDURE — 3066F NEPHROPATHY DOC TX: CPT | Mod: CPTII,S$GLB,, | Performed by: STUDENT IN AN ORGANIZED HEALTH CARE EDUCATION/TRAINING PROGRAM

## 2025-06-16 RX ORDER — EMPAGLIFLOZIN 10 MG/1
10 TABLET, FILM COATED ORAL DAILY
COMMUNITY
Start: 2025-04-10

## 2025-06-16 NOTE — PROGRESS NOTES
[facility]  Nephrology Clinic    Subjective:       HPI ID: [id number] Al is a 69 y.o.  female who returns for ongoing evaluation and management of CKD.   She was previously seen by me in another clinic setting.  She has a pertinent medical history of CHF, morbid obesity, hypertension with prior admissions for hypertensive emergency, rheumatoid arthritis, COPD with home oxygen use at baseline, DM type 2, and chronic kidney disease stage IIIB.  She underwent renal biopsy in April of 2023 which was consistent with severe hypertensive and diabetic nephropathy, 50% glomerular sclerosis 50% interstitial fibrosis and 40% tubular atrophy.     She has no uremic or urinary symptoms and is in her usual state of health.      She was recently discharged from [facility] for LEANNE on CKD d/t CRS. It appears she also had background progression of her CKD as well from CKDIIIb to CKDIV over the last 6 months. We will send her to the CKD education team today for CKD basics and CKD-modalities education.     Interval history:  7/9//24 clinic visit- patient here for ongoing evaluation and management of CKD. She is doing well today and denies acute medical complaints. She has no uremic or urinary symptoms and is in her usual state of health. Losing weight with semaglutide and feeling well.  We reviewed her lab trends at chairside. She is making better improvements into controlling her DM. sCr has improved to 2.2mg/dL. Proteinuria is trending down.     10/15/24 - doing well today. Here for ongoing evaluation and management for CKD. Denies acute medical complaints. Watching her diet with better glucose control. Water intake of 1500ml-2000ml per day. Labs reviewed with patient at chairside today. Renal fxn based on sCr trend remains stable. She reports she is losing weight.     01/31/25 - here for CKD f/u. Feels well today. Denies acute complaints. She has no uremic or urinary symptoms and is in her usual state of health.   She is still maintaining 1500-2000ml of free water intake per day. We reviewed recent labs at chairside.  Renal function based on serum creatinine trend remains at baseline.  Denies further weight loss since last visit  Good glucose control. PCP is increasing dose of semgalutide this week.   We discussed her upward PTH trend despite sensipar. She is not interested in NM imaging at this time.     6/16/25 - here for CKD f/u visit today. Feels well. Reports her appetite is poor (on GLP). Reports lost 10lbs since starting. Avoiding dehydration. She has no uremic or urinary symptoms and is in her usual state of health.    We reviewed recent labs at chairside. Renal function based on serum creatinine trend remains at baseline.      Review of Systems   Constitutional:  Negative for chills, diaphoresis and fever.   Respiratory:  Negative for cough and shortness of breath.    Cardiovascular:  Positive for leg swelling. Negative for chest pain.   Gastrointestinal:  Negative for abdominal pain, diarrhea, nausea and vomiting.   Genitourinary:  Negative for difficulty urinating, dysuria and hematuria.   Musculoskeletal:  Negative for myalgias.   Neurological:  Negative for headaches.   Hematological:  Does not bruise/bleed easily.   All other systems reviewed and are negative.      The past medical, family and social histories were reviewed for this encounter.     Past Medical History:   Diagnosis Date    Biot's respiration 2016    CHF (congestive heart failure)     Diabetes mellitus     Diastolic heart failure     Hypertension     Obesity hypoventilation syndrome     on home O2 at 2L    Obstructive sleep apnea     Renal disorder 2023    Stage IV    Thyroid disease        Current Outpatient Medications   Medication Sig    acetaminophen (TYLENOL) 325 MG tablet Take 2 tablets (650 mg total) by mouth every 4 (four) hours as needed.    albuterol sulfate 2.5 mg/0.5 mL Nebu Take 2.5 mg by nebulization every 4 (four) hours as needed  (wheezing/shortness of breathe). Rescue    allopurinoL (ZYLOPRIM) 100 MG tablet Take 1 tablet (100 mg total) by mouth once daily.    aspirin (ECOTRIN) 81 MG EC tablet Take 81 mg by mouth once daily.    BREZTRI AEROSPHERE 160-9-4.8 mcg/actuation HFAA Inhale into the lungs.    cholecalciferol, vitamin D3, (VITAMIN D3) 5,000 unit Tab Take 10,000 Units by mouth once daily. Indications: low vitamin D levels    cinacalcet (SENSIPAR) 30 MG Tab Take 1 tablet (30 mg total) by mouth every Mon, Wed, Fri. Take with breakfast    cyanocobalamin, vitamin B-12, 2,500 mcg Tab Take 5,000 mcg by mouth once daily.    DULoxetine (CYMBALTA) 20 MG capsule Take 1 capsule (20 mg total) by mouth once daily.    hydrALAZINE (APRESOLINE) 100 MG tablet Take 1 tablet (100 mg total) by mouth every 8 (eight) hours.    isosorbide mononitrate (IMDUR) 30 MG 24 hr tablet Take 1 tablet (30 mg total) by mouth every evening.    JARDIANCE 10 mg tablet Take 10 mg by mouth once daily.    levocetirizine (XYZAL) 5 MG tablet Take 5 mg by mouth every evening.    linaGLIPtin (TRADJENTA) 5 mg Tab tablet Take 5 mg by mouth once daily.    liothyronine (CYTOMEL) 25 MCG Tab Take 25 mcg by mouth Daily. Indications: a condition with low thyroid hormone levels    multivitamin capsule Take 1 capsule by mouth once daily.    nebivoloL (BYSTOLIC) 10 MG Tab Take 10 mg by mouth once daily.    OXYGEN-AIR DELIVERY SYSTEMS MISC 3 L/min by Nasal route continuous. 2-4 lpm  Indications: air hunger prevention     rosuvastatin (CRESTOR) 40 MG Tab Take 40 mg by mouth once daily.    semaglutide (OZEMPIC) 1 mg/dose (4 mg/3 mL) Inject 1 mg into the skin every Sunday.    sulfaSALAzine (AZULFIDINE) 500 mg Tab TAKE 2 TABLETS IN THE MORNING AND TAKE 1 TABLET EVERY NIGHT AS DIRECTED (FOLLOW HANDOUT FOR STARTING.)    zinc gluconate 50 mg tablet Take 50 mg by mouth once daily.    albuterol-ipratropium (DUO-NEB) 2.5 mg-0.5 mg/3 mL nebulizer solution Take 3 mLs by nebulization every 6 (six) hours  "as needed for Wheezing. Rescue    furosemide (LASIX) 40 MG tablet Take 1 tablet (40 mg total) by mouth once daily.    levothyroxine (SYNTHROID, LEVOTHROID) 175 MCG tablet Take 1 tablet (175 mcg total) by mouth before breakfast.     No current facility-administered medications for this visit.     /60 (BP Location: Left forearm, Patient Position: Sitting)   Pulse 69   Ht 5' 1" (1.549 m)   Wt 126.6 kg (279 lb 1.6 oz)   LMP  (LMP Unknown)   SpO2 97%   BMI 52.74 kg/m²     Objective:      Physical Exam  Vitals reviewed.   Constitutional:       General: She is not in acute distress.     Appearance: She is obese.   Cardiovascular:      Pulses: Normal pulses.      Heart sounds: Normal heart sounds.      No friction rub.   Pulmonary:      Effort: Pulmonary effort is normal. No respiratory distress.      Breath sounds: Normal breath sounds.   Abdominal:      General: Abdomen is protuberant.      Palpations: Abdomen is soft.      Tenderness: There is no abdominal tenderness.   Musculoskeletal:         General: No swelling.      Right lower leg: No edema.      Left lower leg: No edema.   Neurological:      Mental Status: She is oriented to person, place, and time.      Motor: No weakness.   Psychiatric:         Mood and Affect: Mood normal.         Behavior: Behavior normal.         Assessment:     Lab Results   Component Value Date    CREATININE 2.17 (H) 06/09/2025    CREATININE 2.17 (H) 06/09/2025    CREATININE 2.18 (H) 06/09/2025    BUN 36 (H) 06/09/2025    BUN 36 (H) 06/09/2025     06/09/2025     06/09/2025    K 5.3 (H) 06/09/2025    K 5.3 (H) 06/09/2025     06/09/2025     06/09/2025    CO2 28 06/09/2025    CO2 28 06/09/2025     Lab Results   Component Value Date    .5 (H) 06/09/2025    CALCIUM 9.3 06/09/2025    CALCIUM 9.3 06/09/2025    PHOS 3.1 06/09/2025     Lab Results   Component Value Date    HCT 44.2 06/09/2025    HCT 44.2 06/09/2025     Prot/Creat Ratio, Urine   Date Value " Ref Range Status   07/02/2024 1911.9 (H) 10.0 - 107.0 mg/g Final   04/08/2023 4466.5 (H) 10.0 - 107.0 mg/g Final       Lab Results   Component Value Date    MICALBCREAT 767.2 (H) 07/02/2024    MICALBCREAT Unable to calculate 10/12/2023    MICALBCREAT Unable to calculate 04/08/2023    MICALBCREAT 8.7 11/03/2011           1. CKD (chronic kidney disease) stage 4, GFR 15-29 ml/min    2. Type 2 diabetes mellitus with diabetic nephropathy, with long-term current use of insulin    3. Primary hypertension    4. Rheumatoid arthritis with positive rheumatoid factor, involving unspecified site    5. Morbid obesity    6. OLIVIER on CPAP    7. Secondary hyperparathyroidism of renal origin          Plan:   Return to clinic in 4 months.  Baseline creatinine is 2.2-2.5mg/dL.  Orders Placed This Encounter   Procedures    Renal Function Panel    PTH, Intact    Protein/Creatinine Ratio, Urine       CKD G4 / A3  Risk: known biopsy proven hypertensive nephrosclerosis and diabetic nephropathy  Renal fxn at baseline value today  Continue on SGLT2-I and GLP1 for CKD-MACE risk reduction,  Does not tolerated RASI d/t recurrent hyperkalemia; not interested in taking enteric K binder  Start QoD loop diuretic for kaliruesis purposes  Renal fxn too advanced for Non-steroidal Mineralocorticoid Receptor Antagonist   Completed kidney education class -> in center [facility] is her preference  AVF referral once GFR <18ml/min  Encourage fluid intake to avoid dehydration.     DM type II - f/u with pcp to ensure tighter glucose control. Reviewed HgbA1c trend    Hypertension- We reviewed her home BP logs, which demonstrated control. She is on hydralazine TID and imdur with good control. She rarely takes lasix for PRN swelling, but will start taking MWF moving forward for electrolyte management.     Morbid obesity- Mediterranean diet for health benefits and weight loss.   We discussed aerobic exercise (walking for 30 min) at least 3x weekly as tolerated. On GLP1a as  well with titration per PCP    Rheumatoid arthritis - on Sulfasalazine    Secondary Hyperparathyroidism - PTH improving; Vit d level sufficient. Monitoring trends. Takes 10K vit D3 everyday d/t absorption. Continue sensipar 30 MWF today.     Hyperuricemia w/ h/o GOUT  -on daily allopurinol. Followed by Rheumatology  -last flare >6 months ago.      __________________________  Laith Diaz MD  [facility] [place]    Part of this note has been created using [employer] dictation system. Errors in transcription may not be completely avoided.      KFRE 2-Year: 15.2% at 6/9/2025 10:14 AM  Calculated from:  Serum Creatinine: 2.17 mg/dL at 6/9/2025 10:14 AM  Urine Albumin Creatinine Ratio: 767.2 ug/mg at 7/2/2024  9:47 AM  Age: 69 years  Sex: Female at 6/9/2025 10:14 AM  Has CKD-3 to CKD-5: Yes    KFRE 5-Year: 40.2% at 6/9/2025 10:14 AM  Calculated from:  Serum Creatinine: 2.17 mg/dL at 6/9/2025 10:14 AM  Urine Albumin Creatinine Ratio: 767.2 ug/mg at 7/2/2024  9:47 AM  Age: 69 years  Sex: Female at 6/9/2025 10:14 AM  Has CKD-3 to CKD-5: Yes

## 2025-06-28 DIAGNOSIS — M05.9 RHEUMATOID ARTHRITIS WITH POSITIVE RHEUMATOID FACTOR, INVOLVING UNSPECIFIED SITE: ICD-10-CM

## 2025-06-28 DIAGNOSIS — M17.4 OTHER SECONDARY OSTEOARTHRITIS OF BOTH KNEES: ICD-10-CM

## 2025-07-01 RX ORDER — DULOXETIN HYDROCHLORIDE 20 MG/1
20 CAPSULE, DELAYED RELEASE ORAL
Qty: 100 CAPSULE | Refills: 3 | Status: SHIPPED | OUTPATIENT
Start: 2025-07-01